# Patient Record
Sex: FEMALE | Race: WHITE | Employment: PART TIME | URBAN - METROPOLITAN AREA
[De-identification: names, ages, dates, MRNs, and addresses within clinical notes are randomized per-mention and may not be internally consistent; named-entity substitution may affect disease eponyms.]

---
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
## 2021-09-13 ENCOUNTER — OFFICE VISIT (OUTPATIENT)
Dept: ORTHOPEDIC SURGERY | Age: 21
End: 2021-09-13
Payer: MEDICAID

## 2021-09-13 VITALS — WEIGHT: 250 LBS | HEIGHT: 63 IN | BODY MASS INDEX: 44.3 KG/M2

## 2021-09-13 DIAGNOSIS — M67.431 GANGLION CYST OF WRIST, RIGHT: Primary | ICD-10-CM

## 2021-09-13 PROCEDURE — 99212 OFFICE O/P EST SF 10 MIN: CPT | Performed by: ORTHOPAEDIC SURGERY

## 2021-09-13 PROCEDURE — G8417 CALC BMI ABV UP PARAM F/U: HCPCS | Performed by: ORTHOPAEDIC SURGERY

## 2021-09-13 PROCEDURE — G8427 DOCREV CUR MEDS BY ELIG CLIN: HCPCS | Performed by: ORTHOPAEDIC SURGERY

## 2021-09-13 PROCEDURE — 1036F TOBACCO NON-USER: CPT | Performed by: ORTHOPAEDIC SURGERY

## 2021-09-13 RX ORDER — MONTELUKAST SODIUM 10 MG/1
TABLET ORAL NIGHTLY
COMMUNITY
Start: 2021-09-09

## 2021-09-13 RX ORDER — CLONIDINE HYDROCHLORIDE 0.3 MG/1
TABLET ORAL
COMMUNITY
Start: 2021-08-10 | End: 2022-04-21

## 2021-09-13 RX ORDER — OMEPRAZOLE 20 MG/1
CAPSULE, DELAYED RELEASE ORAL
COMMUNITY
Start: 2021-08-10 | End: 2022-05-18 | Stop reason: SDUPTHER

## 2021-09-13 NOTE — PROGRESS NOTES
Chief Complaint   Patient presents with    Wrist Pain     Patient here for a ganglion cyst on right wrist. Patient states that she felt it pop about two weeks about and noticed and increase in pain. Geovanna Delvalle is a 22y.o. year old  female who presents for follow up of her right hand ganglion cyst. Her ganglion cyst has returned and is causing pain. Past Medical History:   Diagnosis Date    Asthma      Past Surgical History:   Procedure Laterality Date    APPENDECTOMY  05/17/2017    laparoscopic       Current Outpatient Medications:     cloNIDine (CATAPRES) 0.3 MG tablet, TAKE ONE TABLET BY MOUTH AT BEDTIME, Disp: , Rfl:     montelukast (SINGULAIR) 10 MG tablet, , Disp: , Rfl:     omeprazole (PRILOSEC) 20 MG delayed release capsule, TAKE ONE CAPSULE BY MOUTH DAILY, Disp: , Rfl:     ibuprofen (ADVIL;MOTRIN) 800 MG tablet, Take 1 tablet by mouth every 8 hours as needed for Pain or Fever, Disp: 25 tablet, Rfl: 5    EPINEPHrine (EPIPEN) 0.3 MG/0.3ML SOAJ injection, use as directed, Disp: , Rfl: 1    JUNEL 1.5/30 1.5-30 MG-MCG TABS, TAKE ONE TABLET BY MOUTH DAILY, Disp: , Rfl: 4    buPROPion (WELLBUTRIN XL) 150 MG extended release tablet, TAKE ONE TABLET BY MOUTH EVERY MORNING (Patient not taking: Reported on 9/13/2021), Disp: , Rfl:     naproxen (NAPROSYN) 500 MG tablet, Take 1 tablet by mouth 2 times daily for 7 days, Disp: 14 tablet, Rfl: 0    silver sulfADIAZINE (SILVADENE) 1 % cream, Apply topically daily. (Patient not taking: Reported on 9/13/2021), Disp: 50 g, Rfl: 0    PARoxetine (PAXIL CR) 12.5 MG extended release tablet, TAKE ONE TABLET BY MOUTH EVERY DAY (Patient not taking: Reported on 9/13/2021), Disp: , Rfl: 0    EPINEPHrine (EPIPEN 2-KRYSTA) 0.3 MG/0.3ML SOAJ injection, Inject 0.3 mLs into the skin once as needed for up to 1 dose.  Use as directed for allergic reaction, Disp: 2 each, Rfl: 0    ibuprofen (IBU) 400 MG tablet, Take 1 tablet by mouth every 6 hours as needed for Pain., Disp: 20 tablet, Rfl: 0  Allergies   Allergen Reactions    Bee Venom      Social History     Socioeconomic History    Marital status: Single     Spouse name: Not on file    Number of children: Not on file    Years of education: Not on file    Highest education level: Not on file   Occupational History    Not on file   Tobacco Use    Smoking status: Never Smoker    Smokeless tobacco: Never Used   Substance and Sexual Activity    Alcohol use: No    Drug use: No    Sexual activity: Not on file   Other Topics Concern    Not on file   Social History Narrative    Not on file     Social Determinants of Health     Financial Resource Strain:     Difficulty of Paying Living Expenses:    Food Insecurity:     Worried About Running Out of Food in the Last Year:     [address] in the Last Year:    Transportation Needs:     Lack of Transportation (Medical):  Lack of Transportation (Non-Medical):    Physical Activity:     Days of Exercise per Week:     Minutes of Exercise per Session:    Stress:     Feeling of Stress :    Social Connections:     Frequency of Communication with Friends and Family:     Frequency of Social Gatherings with Friends and Family:     Attends Anabaptism Services:     Active Member of Clubs or Organizations:     Attends Club or Organization Meetings:     Marital Status:    Intimate Partner Violence:     Fear of Current or Ex-Partner:     Emotionally Abused:     Physically Abused:     Sexually Abused:      No family history on file. REVIEW OF SYSTEMS:     General/Constitution:  (-)weight loss, (-)fever, (-)chills, (-)weakness. Skin: (-) rash,(-) psoriasis,(-) eczema, (-)skin cancer. Musculoskeletal: (-) fractures,  (-) dislocations,(-) collagen vascular disease, (-) fibromyalgia, (-) multiple sclerosis, (-) muscular dystrophy, (-) RSD,(-) joint pain (-)swelling, (-) joint pain,swelling.   Neurologic: (-) epilepsy, (-)seizures,(-) brain tumor,(-) TIA, (-)stroke, (-)headaches, (-)Parkinson disease,(-) memory loss, (-) LOC. Cardiovascular: (-) Chest pain, (-) swelling in legs/feet, (-) SOB, (-) cramping in legs/feet with walking. Respiratory: (-) SOB, (-) Coughing, (-) night sweats. GI: (-) nausea, (-) vomiting, (-) diarrhea, (-) blood in stool, (-) gastric ulcer. Psychiatric: (-) Depression, (-) Anxiety, (-) bipolar disease, (-) Alzheimer's Disease  Allergic/Immunologic: (-) allergies latex, (-) allergies metal, (-) skin sensitivity. Hematlogic: (-) anemia, (-) blood transfusion, (-) DVT/PE, (-) Clotting disorders      Subjective:    Constitution:  Ht 5' 3.25\" (1.607 m)   Wt 250 lb (113.4 kg)   BMI 43.94 kg/m²     Psycihatric:  The patient is alert and oriented x 3, appears to be stated age and in no distress. Respiratory:  Respiratory effort is not labored. Patient is not gasping. Palpation of the chest reveals no tactile fremitus. Skin:  Upon inspection: the skin appears warm, dry and intact. There is not a previous scar over the affected area. There is not any cellulitis, lymphedema or cutaneous lesions noted in the lower extremities. Upon palpation there is no induration noted. Neurologic:  Motor exam of the upper extremities show: The reflexes in biceps/triceps/brachioradialis are equal and symmetric. Sensory exam C5-T1 are normal bilaterally. Cardiovascular: The vascular exam is normal and is well perfused to distal extremities. There are 2+ radial pulses bilaterally, and motor and sensation is intact to median, ulnar, and radial, musclocutaneus, and axillary nerve distribution and grossly symmetric bilaterally. There is cap refill noted less than two seconds in all digits. There is not edema of the bilateral upper extremities. There is not varicosities noted in the distal extremities. Lymph:  Upon palpation,  there is no lymphadenopathy noted in bilateral upper extremities.       Musculoskeletal:  Gait: normal; examination of the nails and digits reveal no cyanosis or clubbing. Cervical Exam:  On physical exam, Geovanna Delvalle is well-developed, well-nourished, oriented to person, place and time. her gait is normal.  On evaluation of her cervical spine, she has full range of motion of the cervical spine without pain. There is no cervical tenderness to palpation. Shoulder Exam:  On evaluation of her bilaterally upper extremities, her bilateral shoulder has no deformity. There is not evidence of scapular dyskinesis. There is not muscle atrophy in shoulder girdle. The range of motion for the Right Shoulder is 160/50/t10 and for the Left shoulder is 160/50/t10. Right shoulder Motor strength is 5/5 in the supraspinatus, 5/5 internal rotation and 5/5 in external rotation, and Left shoulder motor strength 5/5 in supraspinatus, 5/5 in internal rotation, 5/5 in external rotation. Elbow exam:  Evaluation of the elbow, reveals no signs of swelling or deformity. ROM is 0-140. There is not instability with varus/valgus stresses. Motor strength is 5/5 with flexion/extension. Wrist exam:  Inspection of the bilateral upper extremities, there is no evidence of deformity of the wrist.  ROM Wrist ROM R wrist DF 90, VF 90, L wrist DF 90, VF 90, R pronation 90/ supination 90, L pronation 90/supination 90. Motor strength is 5/5 with Dorsiflexion/Volarflexion/Supination/Pronation. Motor and sensation is intact and symmetric throughout the bilateral upper extremities in the median, ulnar and radial , musclcutaneous, and axillary nerve distributions. 1x1cm ganglion cyst over radial wrist    Hand exam:  The skin overlying the hand is intact. There is not evidence of scar, lesion, laceration, or abrasion. The motion in the small joints of the hand are intact with no stiffness or deformity. The ROM in the MCP flexion 90/ extension 0 , PIP flexion 90/ extension 0, DIP flexion 70/ extension 0.   There is not rotational deformity. There is no masses or adenopathy in bilateral upper extremities. Radial pulses are 2+ and symmetric bilaterally. Capillary refill is intact and < 2 seconds. Motor strength is 5/5 with flexion and extension of the small finger joints. Right:  Phallens sign(-), Tinnells sign (-), Median nerve compression test (-),  Finklesteins (-), [employer] Grind test (-), [employer](-). Left:    Phallens sign(-), Tinnells sign (-), Median nerve compression test (-),  Finklesteins (-), [employer] Grind test (-), [employer](-). Xrays: n/a    Radiographic findings reviewed with patient    Impression:   Encounter Diagnosis   Name Primary?  Ganglion cyst of wrist, right Yes       Plan:   I discussed the treatment with the patient. I am unable to remove her cyst.  I will refer her to Dr. Zoya Rodriguez.

## 2021-11-17 ENCOUNTER — TELEPHONE (OUTPATIENT)
Dept: ORTHOPEDIC SURGERY | Age: 21
End: 2021-11-17

## 2021-11-17 DIAGNOSIS — M67.40 GANGLION CYST: Primary | ICD-10-CM

## 2021-11-18 ENCOUNTER — OFFICE VISIT (OUTPATIENT)
Dept: ORTHOPEDIC SURGERY | Age: 21
End: 2021-11-18
Payer: MEDICAID

## 2021-11-18 VITALS — BODY MASS INDEX: 43.41 KG/M2 | RESPIRATION RATE: 18 BRPM | WEIGHT: 245 LBS | HEIGHT: 63 IN

## 2021-11-18 DIAGNOSIS — M67.40 GANGLION CYST: Primary | ICD-10-CM

## 2021-11-18 PROCEDURE — G8417 CALC BMI ABV UP PARAM F/U: HCPCS | Performed by: ORTHOPAEDIC SURGERY

## 2021-11-18 PROCEDURE — G8484 FLU IMMUNIZE NO ADMIN: HCPCS | Performed by: ORTHOPAEDIC SURGERY

## 2021-11-18 PROCEDURE — 99214 OFFICE O/P EST MOD 30 MIN: CPT | Performed by: ORTHOPAEDIC SURGERY

## 2021-11-18 PROCEDURE — G8427 DOCREV CUR MEDS BY ELIG CLIN: HCPCS | Performed by: ORTHOPAEDIC SURGERY

## 2021-11-18 PROCEDURE — 1036F TOBACCO NON-USER: CPT | Performed by: ORTHOPAEDIC SURGERY

## 2021-11-18 RX ORDER — BUSPIRONE HYDROCHLORIDE 10 MG/1
10 TABLET ORAL 2 TIMES DAILY
COMMUNITY

## 2021-11-18 NOTE — PROGRESS NOTES
Department of Orthopedic Surgery  History and Physical      CHIEF COMPLAINT:  Right wrist mass    HISTORY OF PRESENT ILLNESS:                The patient is a RHD 22 y.o. female who presents with patient states that she has had a right wrist mass for the last 5 years. She states she has had this drained 2-3 times. Her last drainage was in August of last year. She reports continued recurrence of the cyst.  She states this fluctuates in size. 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 She denies numbness or tingling. She denies any injury. She states this is bothersome to her. Past Medical History:        Diagnosis Date    Anxiety     Asthma     Depression      Past Surgical History:        Procedure Laterality Date    APPENDECTOMY  05/17/2017    laparoscopic    WISDOM TOOTH EXTRACTION       Current Medications:   No current facility-administered medications for this visit. Allergies:  Bee venom    Social History:   TOBACCO:   reports that she has never smoked. She has never used smokeless tobacco.  ETOH:   reports no history of alcohol use. DRUGS:   reports no history of drug use. ACTIVITIES OF DAILY LIVING:    OCCUPATION:    Family History:   No family history on file.     REVIEW OF SYSTEMS:  CONSTITUTIONAL:  negative  EYES:  negative  HEENT:  negative  RESPIRATORY:  asthma  CARDIOVASCULAR:  negative  GASTROINTESTINAL:  negative  GENITOURINARY:  negative  INTEGUMENT/BREAST:  negative  HEMATOLOGIC/LYMPHATIC:  negative  ALLERGIC/IMMUNOLOGIC:  negative  ENDOCRINE:  negative  MUSCULOSKELETAL:  Right wrist pain  NEUROLOGICAL:  negative  BEHAVIOR/PSYCH:  Anxiety, depression    PHYSICAL EXAM:    VITALS:  Resp 18   Ht 5' 3\" (1.6 m)   Wt 245 lb (111.1 kg)   BMI 43.40 kg/m²   CONSTITUTIONAL:  awake, alert, cooperative, no apparent distress, and appears stated age  EYES:  Lids and lashes normal, pupils equal, round and reactive to light, extra ocular muscles intact, sclera clear, conjunctiva normal  ENT:  Normocephalic, without obvious abnormality, atraumatic, sinuses nontender on palpation, external ears without lesions, oral pharynx with moist mucus membranes, tonsils without erythema or exudates, gums normal and good dentition. NECK:  Supple, symmetrical, trachea midline, no adenopathy, thyroid symmetric, not enlarged and no tenderness, skin normal  LUNGS:  CTA  CARDIOVASCULAR:  2+ radial pulses, extremities warm and well perfused  ABDOMEN:  obese, NTTP  CHEST:  Atraumatic   GENITAL/URINARY:  deferred  NEUROLOGIC:  Awake, alert, oriented to name, place and time. Cranial nerves II-XII are grossly intact. Motor is 5 out of 5 bilaterally. Sensory is intact.  gait is normal.  MUSCULOSKELETAL:    Right upper extremity: Non-tender about the shoulder and elbow with good ROM. - tinels of the cubital tunnel, - tinels of the carpal tunnel, - durkans, + finkelsteins, - CMC grind, - tenderness over the A1 pulleys with no active triggering. + palpable mass to the dorsal radial aspect of the wrist with positive transillumination. Full flexion and extension of the fingers. - wartenbergs and cross finger testing, APB strength 5/5 with no atrophy. Median, ulnar, radial n intact to light touch. Brisk capillary refill. Gross motor 5/5. DATA:    CBC:   Lab Results   Component Value Date    WBC 10.2 11/20/2020    RBC 4.81 11/20/2020    HGB 14.2 11/20/2020    HCT 44.5 11/20/2020    MCV 92.5 11/20/2020    MCH 29.5 11/20/2020    MCHC 31.9 11/20/2020    RDW 12.1 11/20/2020     11/20/2020    MPV 10.9 11/20/2020     PT/INR:  No results found for: PROTIME, INR    Radiology Review:  Xray: x-rays of the right wrist were obtained today in the office and reviewed with the patient.  3 views: AP, lateral, oblique: demonstrate no acute fracture dislocations  Impression: No acute fractures or dislocations      IMPRESSION:  · Right dorsal wrist ganglion cyst  · Right DeQuervains release  · Anxiety, depression, Asthma    PLAN:  Discussed findings with patient. Discussed conservative and surgical management with patient. Patient states she would like to have surgical excision of the cyst.  Plan for a right dorsal wrist ganglion cyst excision with DeQuervains release. Postoperative course explained to the patient. Patient like to proceed. All questions answered. I explained the risks, benefits, alternatives and complications of surgery with the patient including but not limited to the risks of infection, possible damage to nerves, vessels, or tendons, stiffness, loss of range of motion, scar sensitivity, wound healing complications, worsening symptoms, possible need for therapy, as well as the possible need further surgery and unanticipated complications. The patient voiced understanding and all questions were answered. The patient elected to proceed with surgical intervention. I have seen and evaluated the patient and agree with the above assessment and plan on today's visit. I have performed the key components of the history and physical examination with significant findings of right wrist de Quervain's tenosynovitis with adjacent right dorsal radial ganglion cyst.  Discussed treatment options in detail. Patient like proceed with cyst excision and first extensor compartment tenolysis. Postoperative course and healing were explained. Risk of recurrence of the cyst was also explained. Poss need for therapy was explained. All questions answered. I concur with the findings and plan as documented.     Dee Voss MD  11/18/2021

## 2021-12-17 ENCOUNTER — PREP FOR PROCEDURE (OUTPATIENT)
Dept: ORTHOPEDIC SURGERY | Age: 21
End: 2021-12-17

## 2021-12-17 RX ORDER — SODIUM CHLORIDE 9 MG/ML
INJECTION, SOLUTION INTRAVENOUS CONTINUOUS
Status: CANCELLED | OUTPATIENT
Start: 2021-12-17

## 2021-12-17 RX ORDER — SODIUM CHLORIDE 0.9 % (FLUSH) 0.9 %
5-40 SYRINGE (ML) INJECTION PRN
Status: CANCELLED | OUTPATIENT
Start: 2021-12-17

## 2021-12-17 RX ORDER — SODIUM CHLORIDE 0.9 % (FLUSH) 0.9 %
5-40 SYRINGE (ML) INJECTION EVERY 12 HOURS SCHEDULED
Status: CANCELLED | OUTPATIENT
Start: 2021-12-17

## 2021-12-17 RX ORDER — SODIUM CHLORIDE 9 MG/ML
25 INJECTION, SOLUTION INTRAVENOUS PRN
Status: CANCELLED | OUTPATIENT
Start: 2021-12-17

## 2021-12-22 RX ORDER — ALBUTEROL SULFATE 90 UG/1
2 AEROSOL, METERED RESPIRATORY (INHALATION) EVERY 6 HOURS PRN
COMMUNITY

## 2021-12-22 NOTE — PROGRESS NOTES
Have you been tested for COVID  Yes           Have you been told you were positive for COVID No  Have you had any known exposure to someone that is positive for COVID No  Do you have a cough                   No              Do you have shortness of breath No                 Do you have a sore throat            No                Are you having chills                    No                Are you having muscle aches. No                    Please come to the hospital wearing a mask and have your significant other wear a mask as well. Both of you should check your temperature before leaving to come here,  if it is 100 or higher please call [phone] for instruction. [facility] PRE-ADMISSION TESTING INSTRUCTIONS    The Preadmission Testing patient is instructed accordingly using the following criteria (check applicable):    ARRIVAL INSTRUCTIONS:  [x] Parking the day of Surgery is located in the Main Entrance lot. Upon entering the door, make an immediate right to the surgery reception desk    [x] Bring photo ID and insurance card    [] Bring in a copy of Living will or Durable Power of  papers.     [x] Please be sure to arrange for responsible adult to provide transportation to and from the hospital    [x] Please arrange for responsible adult to be with you for the 24 hour period post procedure due to having anesthesia      GENERAL INSTRUCTIONS:    [x] Nothing by mouth after midnight, including gum, candy, mints or water    [x] You may brush your teeth, but do not swallow any water    [x] Take medications as instructed with 1-2 oz of water    [] Stop herbal supplements and vitamins 5 days prior to procedure    [] Follow preop dosing of blood thinners per physician instructions    [] Take 1/2 dose of evening insulin, but no insulin after midnight    [] No oral diabetic medications after midnight    [] If diabetic and have low blood sugar or feel symptomatic, take 1-2oz apple juice only    [] Bring inhalers day of surgery    [] Bring C-PAP/ Bi-Pap day of surgery    [x] Bring urine specimen day of surgery    [x] Shower or bath with soap, lather and rinse well, AM of Surgery, no lotion, powders or creams to surgical site    [] Follow bowel prep as instructed per surgeon    [x] No tobacco products within 24 hours of surgery     [x] No alcohol or illegal drug use within 24 hours of surgery.     [x] Jewelry, body piercing's, eyeglasses, contact lenses and dentures are not permitted into surgery (bring cases)      [x] Please do not wear any nail polish, make up or hair products on the day of surgery    [x] You can expect a call the business day prior to procedure to notify you if your arrival time changes    [x] If you receive a survey after surgery we would greatly appreciate your comments    [] Parent/guardian of a minor must accompany their child and remain on the premises  the entire time they are under our care     [] Pediatric patients may bring favorite toy, blanket or comfort item with them    [] A caregiver or family member must remain with the patient during their stay if they are mentally handicapped, have dementia, disoriented or unable to use a call light or would be a safety concern if left unattended    [x] Please notify surgeon if you develop any illness between now and time of surgery (cold, cough, sore throat, fever, nausea, vomiting) or any signs of infections  including skin, wounds, and dental.    [x]  The Outpatient Pharmacy is available to fill your prescription here on your day of surgery, ask your preop nurse for details    [] Other instructions    EDUCATIONAL MATERIALS PROVIDED:    [] PAT Preoperative Education Packet/Booklet     [] Medication List    [] Transfusion bracelet applied with instructions    [] Shower with soap, lather and rinse well, and use CHG wipes provided the evening before surgery as instructed    [] Incentive spirometer with instructions

## 2021-12-28 ENCOUNTER — ANESTHESIA (OUTPATIENT)
Dept: OPERATING ROOM | Age: 21
End: 2021-12-28
Payer: MEDICAID

## 2021-12-28 ENCOUNTER — HOSPITAL ENCOUNTER (OUTPATIENT)
Age: 21
Setting detail: OUTPATIENT SURGERY
Discharge: HOME | End: 2021-12-28
Attending: ORTHOPAEDIC SURGERY | Admitting: ORTHOPAEDIC SURGERY
Payer: MEDICAID

## 2021-12-28 ENCOUNTER — ANESTHESIA EVENT (OUTPATIENT)
Dept: OPERATING ROOM | Age: 21
End: 2021-12-28
Payer: MEDICAID

## 2021-12-28 VITALS
WEIGHT: 245 LBS | SYSTOLIC BLOOD PRESSURE: 118 MMHG | HEART RATE: 69 BPM | BODY MASS INDEX: 43.41 KG/M2 | HEIGHT: 63 IN | OXYGEN SATURATION: 100 % | RESPIRATION RATE: 16 BRPM | DIASTOLIC BLOOD PRESSURE: 72 MMHG | TEMPERATURE: 97 F

## 2021-12-28 VITALS — OXYGEN SATURATION: 100 % | DIASTOLIC BLOOD PRESSURE: 58 MMHG | SYSTOLIC BLOOD PRESSURE: 108 MMHG

## 2021-12-28 DIAGNOSIS — G89.18 POST-OPERATIVE PAIN: Primary | ICD-10-CM

## 2021-12-28 LAB
HCG, URINE, POC: NEGATIVE
Lab: NORMAL
NEGATIVE QC PASS/FAIL: NORMAL
POSITIVE QC PASS/FAIL: NORMAL

## 2021-12-28 PROCEDURE — 2500000003 HC RX 250 WO HCPCS: Performed by: ORTHOPAEDIC SURGERY

## 2021-12-28 PROCEDURE — 2580000003 HC RX 258: Performed by: PHYSICIAN ASSISTANT

## 2021-12-28 PROCEDURE — 25111 REMOVE WRIST TENDON LESION: CPT | Performed by: ORTHOPAEDIC SURGERY

## 2021-12-28 PROCEDURE — 2709999900 HC NON-CHARGEABLE SUPPLY: Performed by: ORTHOPAEDIC SURGERY

## 2021-12-28 PROCEDURE — 3600000002 HC SURGERY LEVEL 2 BASE: Performed by: ORTHOPAEDIC SURGERY

## 2021-12-28 PROCEDURE — 3700000000 HC ANESTHESIA ATTENDED CARE: Performed by: ORTHOPAEDIC SURGERY

## 2021-12-28 PROCEDURE — 7100000011 HC PHASE II RECOVERY - ADDTL 15 MIN: Performed by: ORTHOPAEDIC SURGERY

## 2021-12-28 PROCEDURE — 25000 INCISION OF TENDON SHEATH: CPT | Performed by: ORTHOPAEDIC SURGERY

## 2021-12-28 PROCEDURE — 88304 TISSUE EXAM BY PATHOLOGIST: CPT

## 2021-12-28 PROCEDURE — 3700000001 HC ADD 15 MINUTES (ANESTHESIA): Performed by: ORTHOPAEDIC SURGERY

## 2021-12-28 PROCEDURE — 6360000002 HC RX W HCPCS: Performed by: PHYSICIAN ASSISTANT

## 2021-12-28 PROCEDURE — 7100000010 HC PHASE II RECOVERY - FIRST 15 MIN: Performed by: ORTHOPAEDIC SURGERY

## 2021-12-28 PROCEDURE — 6360000002 HC RX W HCPCS: Performed by: NURSE ANESTHETIST, CERTIFIED REGISTERED

## 2021-12-28 PROCEDURE — 3600000012 HC SURGERY LEVEL 2 ADDTL 15MIN: Performed by: ORTHOPAEDIC SURGERY

## 2021-12-28 PROCEDURE — 2500000003 HC RX 250 WO HCPCS: Performed by: NURSE ANESTHETIST, CERTIFIED REGISTERED

## 2021-12-28 RX ORDER — SODIUM CHLORIDE 0.9 % (FLUSH) 0.9 %
5-40 SYRINGE (ML) INJECTION EVERY 12 HOURS SCHEDULED
Status: DISCONTINUED | OUTPATIENT
Start: 2021-12-28 | End: 2021-12-28 | Stop reason: HOSPADM

## 2021-12-28 RX ORDER — FENTANYL CITRATE 50 UG/ML
INJECTION, SOLUTION INTRAMUSCULAR; INTRAVENOUS PRN
Status: DISCONTINUED | OUTPATIENT
Start: 2021-12-28 | End: 2021-12-28 | Stop reason: SDUPTHER

## 2021-12-28 RX ORDER — SODIUM CHLORIDE 9 MG/ML
INJECTION, SOLUTION INTRAVENOUS CONTINUOUS
Status: DISCONTINUED | OUTPATIENT
Start: 2021-12-28 | End: 2021-12-28 | Stop reason: HOSPADM

## 2021-12-28 RX ORDER — SODIUM CHLORIDE 0.9 % (FLUSH) 0.9 %
5-40 SYRINGE (ML) INJECTION PRN
Status: DISCONTINUED | OUTPATIENT
Start: 2021-12-28 | End: 2021-12-28 | Stop reason: HOSPADM

## 2021-12-28 RX ORDER — SODIUM CHLORIDE 9 MG/ML
25 INJECTION, SOLUTION INTRAVENOUS PRN
Status: DISCONTINUED | OUTPATIENT
Start: 2021-12-28 | End: 2021-12-28 | Stop reason: HOSPADM

## 2021-12-28 RX ORDER — LIDOCAINE HYDROCHLORIDE AND EPINEPHRINE 10; 10 MG/ML; UG/ML
INJECTION, SOLUTION INFILTRATION; PERINEURAL PRN
Status: DISCONTINUED | OUTPATIENT
Start: 2021-12-28 | End: 2021-12-28 | Stop reason: ALTCHOICE

## 2021-12-28 RX ORDER — GLYCOPYRROLATE 1 MG/5 ML
SYRINGE (ML) INTRAVENOUS PRN
Status: DISCONTINUED | OUTPATIENT
Start: 2021-12-28 | End: 2021-12-28 | Stop reason: SDUPTHER

## 2021-12-28 RX ORDER — PROPOFOL 10 MG/ML
INJECTION, EMULSION INTRAVENOUS PRN
Status: DISCONTINUED | OUTPATIENT
Start: 2021-12-28 | End: 2021-12-28 | Stop reason: SDUPTHER

## 2021-12-28 RX ORDER — ONDANSETRON 2 MG/ML
INJECTION INTRAMUSCULAR; INTRAVENOUS PRN
Status: DISCONTINUED | OUTPATIENT
Start: 2021-12-28 | End: 2021-12-28 | Stop reason: SDUPTHER

## 2021-12-28 RX ORDER — MIDAZOLAM HYDROCHLORIDE 1 MG/ML
INJECTION INTRAMUSCULAR; INTRAVENOUS PRN
Status: DISCONTINUED | OUTPATIENT
Start: 2021-12-28 | End: 2021-12-28 | Stop reason: SDUPTHER

## 2021-12-28 RX ORDER — HYDROCODONE BITARTRATE AND ACETAMINOPHEN 5; 325 MG/1; MG/1
1 TABLET ORAL EVERY 6 HOURS PRN
Qty: 15 TABLET | Refills: 0 | Status: SHIPPED | OUTPATIENT
Start: 2021-12-28 | End: 2022-01-04

## 2021-12-28 RX ORDER — PROPOFOL 10 MG/ML
INJECTION, EMULSION INTRAVENOUS PRN
Status: DISCONTINUED | OUTPATIENT
Start: 2021-12-28 | End: 2021-12-28

## 2021-12-28 RX ADMIN — ONDANSETRON 4 MG: 2 INJECTION INTRAMUSCULAR; INTRAVENOUS at 09:13

## 2021-12-28 RX ADMIN — MIDAZOLAM 2 MG: 1 INJECTION INTRAMUSCULAR; INTRAVENOUS at 09:09

## 2021-12-28 RX ADMIN — FENTANYL CITRATE 50 MCG: 50 INJECTION, SOLUTION INTRAMUSCULAR; INTRAVENOUS at 09:14

## 2021-12-28 RX ADMIN — SODIUM CHLORIDE: 9 INJECTION, SOLUTION INTRAVENOUS at 09:09

## 2021-12-28 RX ADMIN — Medication 2000 MG: at 09:15

## 2021-12-28 RX ADMIN — Medication 0.2 MG: at 09:09

## 2021-12-28 RX ADMIN — PROPOFOL 700 MG: 10 INJECTION, EMULSION INTRAVENOUS at 09:14

## 2021-12-28 RX ADMIN — FENTANYL CITRATE 50 MCG: 50 INJECTION, SOLUTION INTRAMUSCULAR; INTRAVENOUS at 09:18

## 2021-12-28 ASSESSMENT — PULMONARY FUNCTION TESTS
PIF_VALUE: 0
PIF_VALUE: 1
PIF_VALUE: 0
PIF_VALUE: 1
PIF_VALUE: 0

## 2021-12-28 ASSESSMENT — PAIN - FUNCTIONAL ASSESSMENT: PAIN_FUNCTIONAL_ASSESSMENT: 0-10

## 2021-12-28 NOTE — PROGRESS NOTES
Patient provided discharge instructions and verbalizes understanding  Patient discharged home safely with brother  Sling in place at time of discharge

## 2021-12-28 NOTE — BRIEF OP NOTE
Brief Postoperative Note      Patient: Brina Donis  YOB: 2000  MRN: [id number]    Date of Procedure: 12/28/2021    Pre-Op Diagnosis: GANGLION CYST RIGHT  DEQUERVAINS    Post-Op Diagnosis: Same       Procedure(s):  RIGHT DEQUERVAINS RELEASE  RIGHT WRIST GANGLION CYST EXCISION    Surgeon(s):  Venancio Bailey MD    Assistant:   Will Beucler    Anesthesia: Monitor Anesthesia Care    Estimated Blood Loss (mL): Minimal    Complications: None    Specimens:   * No specimens in log *    Implants:  * No implants in log *      Drains: * No LDAs found *    Findings: see op note    Electronically signed by OBED Islas on 12/28/2021 at 9:40 AM

## 2021-12-28 NOTE — PROGRESS NOTES
CLINICAL PHARMACY NOTE: MEDS TO BEDS    Total # of Prescriptions Filled: 1   The following medications were delivered to the patient:  · norco 5/325    Additional Documentation:

## 2021-12-28 NOTE — ANESTHESIA PRE PROCEDURE
43.4 kg/m². CBC:   Lab Results   Component Value Date    WBC 10.2 11/20/2020    RBC 4.81 11/20/2020    HGB 14.2 11/20/2020    HCT 44.5 11/20/2020    MCV 92.5 11/20/2020    RDW 12.1 11/20/2020     11/20/2020       CMP:   Lab Results   Component Value Date     11/20/2020    K 4.2 11/20/2020     11/20/2020    CO2 23 11/20/2020    BUN 13 11/20/2020    CREATININE 0.9 11/20/2020    GFRAA >60 11/20/2020    LABGLOM >60 11/20/2020    GLUCOSE 101 05/17/2017    PROT 7.6 11/20/2020    CALCIUM 9.3 11/20/2020    BILITOT 0.2 11/20/2020    ALKPHOS 117 11/20/2020    AST 22 11/20/2020    ALT 27 11/20/2020       POC Tests: No results for input(s): POCGLU, POCNA, POCK, POCCL, POCBUN, POCHEMO, POCHCT in the last 72 hours. Coags: No results found for: PROTIME, INR, APTT    HCG (If Applicable):   Lab Results   Component Value Date    PREGTESTUR neg 05/17/2017        ABGs: No results found for: PHART, PO2ART, IWM7IMT, KIY7UBZ, BEART, B3RCRLVM     Type & Screen (If Applicable):  No results found for: LABABO, LABRH    Drug/Infectious Status (If Applicable):  No results found for: HIV, HEPCAB    COVID-19 Screening (If Applicable): No results found for: COVID19        Anesthesia Evaluation  Patient summary reviewed and Nursing notes reviewed no history of anesthetic complications:   Airway: Mallampati: II  TM distance: >3 FB   Neck ROM: full  Mouth opening: > = 3 FB Dental: normal exam         Pulmonary:normal exam    (+) asthma: seasonal asthma,                            Cardiovascular:Negative CV ROS  Exercise tolerance: good (>4 METS),                     Neuro/Psych:   (+) headaches:, psychiatric history:            GI/Hepatic/Renal: Neg GI/Hepatic/Renal ROS            Endo/Other: Negative Endo/Other ROS                    Abdominal:             Vascular: negative vascular ROS. Other Findings:             Anesthesia Plan      MAC     ASA 2       Induction: intravenous.       Anesthetic plan and risks discussed with patient and sibling. Plan discussed with CRNA and surgical team.    Attending anesthesiologist reviewed and agrees with Karen Parra MD   12/28/2021        Patient seen; chart reviewed and agree with above.     Ashley Russ MD

## 2021-12-28 NOTE — H&P
Department of Orthopedic Surgery  History and Physical        CHIEF COMPLAINT:  Right wrist mass     HISTORY OF PRESENT ILLNESS:                 The patient is a RHD 22 y.o. female who presents with patient states that she has had a right wrist mass for the last 5 years. She states she has had this drained 2-3 times. Her last drainage was in August of last year. She reports continued recurrence of the cyst.  She states this fluctuates in size. 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 She denies numbness or tingling. She denies any injury. She states this is bothersome to her.      Past Medical History:    Past Medical History             Diagnosis Date    Anxiety      Asthma      Depression           Past Surgical History:    Past Surgical History             Procedure Laterality Date    APPENDECTOMY   05/17/2017     laparoscopic    WISDOM TOOTH EXTRACTION             Current Medications:   Current Hospital Medications   No current facility-administered medications for this visit. Allergies:  Bee venom     Social History:   TOBACCO:   reports that she has never smoked. She has never used smokeless tobacco.  ETOH:   reports no history of alcohol use. DRUGS:   reports no history of drug use.   ACTIVITIES OF DAILY LIVING:    OCCUPATION:    Family History:   Family History   No family history on file.        REVIEW OF SYSTEMS:  CONSTITUTIONAL:  negative  EYES:  negative  HEENT:  negative  RESPIRATORY:  asthma  CARDIOVASCULAR:  negative  GASTROINTESTINAL:  negative  GENITOURINARY:  negative  INTEGUMENT/BREAST:  negative  HEMATOLOGIC/LYMPHATIC:  negative  ALLERGIC/IMMUNOLOGIC:  negative  ENDOCRINE:  negative  MUSCULOSKELETAL:  Right wrist pain  NEUROLOGICAL:  negative  BEHAVIOR/PSYCH:  Anxiety, depression     PHYSICAL EXAM:    VITALS:  Resp 18   Ht 5' 3\" (1.6 m)   Wt 245 lb (111.1 kg)   BMI 43.40 kg/m²   CONSTITUTIONAL:  awake, alert, cooperative, no apparent distress, and appears stated age  EYES:  Lids and lashes normal, pupils equal, round and reactive to light, extra ocular muscles intact, sclera clear, conjunctiva normal  ENT:  Normocephalic, without obvious abnormality, atraumatic, sinuses nontender on palpation, external ears without lesions, oral pharynx with moist mucus membranes, tonsils without erythema or exudates, gums normal and good dentition. NECK:  Supple, symmetrical, trachea midline, no adenopathy, thyroid symmetric, not enlarged and no tenderness, skin normal  LUNGS:  CTA  CARDIOVASCULAR:  2+ radial pulses, extremities warm and well perfused  ABDOMEN:  obese, NTTP  CHEST:  Atraumatic   GENITAL/URINARY:  deferred  NEUROLOGIC:  Awake, alert, oriented to name, place and time. Cranial nerves II-XII are grossly intact. Motor is 5 out of 5 bilaterally. Sensory is intact.  gait is normal.  MUSCULOSKELETAL:     Right upper extremity: Non-tender about the shoulder and elbow with good ROM. - tinels of the cubital tunnel, - tinels of the carpal tunnel, - durkans, + finkelsteins, - CMC grind, - tenderness over the A1 pulleys with no active triggering. + palpable mass to the dorsal radial aspect of the wrist with positive transillumination. Full flexion and extension of the fingers. - wartenbergs and cross finger testing, APB strength 5/5 with no atrophy. Median, ulnar, radial n intact to light touch. Brisk capillary refill. Gross motor 5/5.         DATA:    CBC:         Lab Results   Component Value Date     WBC 10.2 11/20/2020     RBC 4.81 11/20/2020     HGB 14.2 11/20/2020     HCT 44.5 11/20/2020     MCV 92.5 11/20/2020     MCH 29.5 11/20/2020     MCHC 31.9 11/20/2020     RDW 12.1 11/20/2020      11/20/2020     MPV 10.9 11/20/2020      PT/INR:  No results found for: PROTIME, INR     Radiology Review:  Xray: x-rays of the right wrist were obtained today in the office and reviewed with the patient.  3 views: AP, lateral, oblique: demonstrate no acute fracture dislocations  Impression: No acute fractures or dislocations        IMPRESSION:  · Right dorsal wrist ganglion cyst  · Right DeQuervains release  · Anxiety, depression, Asthma     PLAN:  Discussed findings with patient. Discussed conservative and surgical management with patient. Patient states she would like to have surgical excision of the cyst.  Plan for a right dorsal wrist ganglion cyst excision with DeQuervains release. Postoperative course explained to the patient. Patient like to proceed. All questions answered.     I explained the risks, benefits, alternatives and complications of surgery with the patient including but not limited to the risks of infection, possible damage to nerves, vessels, or tendons, stiffness, loss of range of motion, scar sensitivity, wound healing complications, worsening symptoms, possible need for therapy, as well as the possible need further surgery and unanticipated complications. The patient voiced understanding and all questions were answered. The patient elected to proceed with surgical intervention.         I have seen and evaluated the patient and agree with the above assessment and plan on today's visit. I have performed the key components of the history and physical examination with significant findings of right wrist de Quervain's tenosynovitis with adjacent right dorsal radial ganglion cyst.  Discussed treatment options in detail. Patient like proceed with cyst excision and first extensor compartment tenolysis. Postoperative course and healing were explained. Risk of recurrence of the cyst was also explained. Poss need for therapy was explained. All questions answered. I concur with the findings and plan as documented.     History and Physical Update     Patient was seen and examined. Patient's history and physical was reviewed with the patient. There has been no significant interval changes.       Electronically signed by Rach Bauer MD on 12/28/2021 at 7:22 AM

## 2021-12-28 NOTE — OP NOTE
Operative Note      Patient: Rylan Busch  YOB: 2000  MRN: [id number]    Date of Procedure: 12/28/2021    Pre-Op Diagnosis: GANGLION CYST RIGHT  DEQUERVAINS    Post-Op Diagnosis: Same       Procedure(s):  RIGHT DEQUERVAINS RELEASE  RIGHT WRIST GANGLION CYST EXCISION    Surgeon(s):  Ced Becker MD    Assistant:   Physician Assistant: OBED Julien  Resident: Clifton REYES BeuclerDO    Anesthesia: Monitor Anesthesia Care    Estimated Blood Loss (mL): Minimal    Complications: Bleeding    Specimens:   ID Type Source Tests Collected by Time Destination   A : right ganglion cyst Specimen Cyst SURGICAL PATHOLOGY Ced Becker MD 12/28/2021 [id number]        Implants:  * No implants in log *      Drains: * No LDAs found *    Findings: see details    Detailed Description of Procedure:                                     OPERATIVE REPORT          DATE OF PROCEDURE:   12/28/2021     PREOPERATIVE DIAGNOSES:    1. Right De Quervain's tenosynovitis. 2. Right wrist ganglion cyste     POSTOPERATIVE DIAGNOSES: same       SURGERY PERFORMED:  1. Right De Quervain's release. 2. Right wrist ganglion cyst excision     ANESTHESIA:  1. Monitored anesthesia care. 2.  Local anesthetic by surgeon consisting of approximately 10 mL of 0.25%  Marcaine with epinephrine.     ASSISTANT: Dr Brayden Presley orthopedic surgery resident       FINDINGS:  1.  Evidence of De Quervain's tenosynovitis involving the extensor  compartment that was adequately decompressed with release of the  compartment. 2. No significant tendon instability was appreciated status post  Decompression. 3.  Large ganglion cyst arising off the dorsal radial wrist capsule between the first and second wrist compartments. Cyst was excised and sent for pathology.     SPECIMENS:  None.     COMPLICATIONS:  None.     DISPOSITION:  The patient remained stable throughout the procedure.     OPERATIVE INDICATIONS:  The patient presents with persistent and recalcitrant left radial-sided wrist pain. After failing conservative management, they wished to proceed with surgical  intervention. The risks included, but were not limited to the risk of  infection; damage to nerves, vessels, or tendons; tendon instability;  failure to improve her symptoms; worsening symptoms; recurrence of  symptoms; scar adhesions; as well as recurrence of the cyst.  The patient  understood and wished to proceed.     SURGERY IN DETAIL:  The patient was identified in the holding area. The  left wrist was identified as the surgical site. The patient was then seen by  Anesthesia, taken to the operating room, placed supine on the table, and  underwent monitored anesthesia care per the Anesthesia Department. Under  sterile conditions, 10 mL of 0.25% Marcaine with epinephrine was  infiltrated over the surgical site. A well-padded arm tourniquet was  placed. The left upper extremity was prepped and draped in the standard  sterile fashion. The arm was exsanguinated. Tourniquet was inflated to  250 mmHg. Total tourniquet time was approximately 17 minutes.     An incision was made over the radial styloid of the wrist longitudinally  followed by blunt dissection, identification, and preservation of the  radial sensory nerve branches. These were retracted. The first extensor  compartment was then clearly identified. An incision over the dorsal ulnar aspect of the  extensor reticulum to the first compartment was then created. There was  multiple slips of the extensors present. Decompression was taken from the  myotendinous origin to the distal insertion distally. There was evidence  of stenosis that was adequately decompressed with release of the  compartment. There was a separate compartment to the APL and EPB  Tendons. The separate compartment was released fully decompressing both the EPB and APL tendons. The intracompartmental septum was debrided to stable margins.     Attention was then directed distally and ulnar to the first compartment. Blunt dissection was performed identify the cyst arising over the dorsal radial wrist capsule. The cyst was traced down to the dorsal radial capsule between the first and second compartments. The cyst also traced proximally and radially beneath the first compartment. The radial artery was identified and preserved distal to the cyst.  Meticulous dissection under loupe magnification was used for hemostasis cauterizing with bipolar cautery the small perforating branches off the radial artery. The cyst was then mobilized off the radial styloid and resected to the dorsal capsule and removed with a stalk extending down to the dorsal capsule. Cautery was used to cauterize the dorsal capsule origin of the cyst.  The cyst was filled with a clear gelatinous material consistent with a ganglion. The cyst was sent for pathology.     At this point, the tourniquet was deflated. Hemostasis was achieved with  bipolar cautery. The radial artery was inspected. The artery was patent with good hemostasis achieved. With pronation and supination of the forearm, there was  no significant tendon instability. The skin was closed in layers with 3-0  Vicryl and a 4-0 Monocryl.   Mastisol and Steri-Strips were applied followed  by a soft bulky sterile dressing and thumb spica splint    The patient tolerated the procedure well and was taken to the recovery room in stable condition    Electronically signed by Sammy Diez MD on 12/28/2021 at 9:53 AM           Electronically signed by Sammy Diez MD on 12/28/2021 at 9:52 AM

## 2021-12-28 NOTE — ANESTHESIA POSTPROCEDURE EVALUATION
Department of Anesthesiology  Postprocedure Note    Patient: Iwona Arenas  MRN: [id number]  [place]: 2000  Date of evaluation: 12/28/2021  Time:  10:15 AM     Procedure Summary     Date: 12/28/21 Room / Location: [facility] OR 04 / [facility]    Anesthesia Start: 8073 Anesthesia Stop: 1015    Procedures:       RIGHT DEQUERVAINS RELEASE (Right )      RIGHT WRIST GANGLION CYST EXCISION (Right ) Diagnosis: (GANGLION CYST RIGHT  [place])    Surgeons: Alma Geiger MD Responsible Provider: Latonya James MD    Anesthesia Type: MAC ASA Status: 2          Anesthesia Type: MAC    Isabel Phase I: Isabel Score: 10    Isable Phase II:      Last vitals: Reviewed and per EMR flowsheets.        Anesthesia Post Evaluation    Patient location during evaluation: bedside  Patient participation: complete - patient participated  Level of consciousness: awake and alert  Airway patency: patent  Nausea & Vomiting: no nausea and no vomiting  Complications: no  Cardiovascular status: blood pressure returned to baseline  Respiratory status: acceptable  Hydration status: euvolemic

## 2022-01-03 ENCOUNTER — OFFICE VISIT (OUTPATIENT)
Dept: ORTHOPEDIC SURGERY | Age: 22
End: 2022-01-03

## 2022-01-03 VITALS — RESPIRATION RATE: 18 BRPM | WEIGHT: 245 LBS | BODY MASS INDEX: 43.41 KG/M2 | HEIGHT: 63 IN

## 2022-01-03 DIAGNOSIS — R21 RASH: Primary | ICD-10-CM

## 2022-01-03 PROCEDURE — 99024 POSTOP FOLLOW-UP VISIT: CPT | Performed by: ORTHOPAEDIC SURGERY

## 2022-01-03 RX ORDER — BETAMETHASONE DIPROPIONATE 0.5 MG/G
CREAM TOPICAL
Qty: 50 G | Refills: 0 | Status: SHIPPED
Start: 2022-01-03 | End: 2022-02-07

## 2022-01-03 NOTE — PROGRESS NOTES
HPI: Patient presents today 5 days s/p right de Quervain's release with cyst excision. Overall she is doing okay. She does report a lot of itching to her right upper extremity especially under her splint. She has been taking Benadryl with no relief of her symptoms. Physical Exam: Incisions clean dry intact with Steri-Strips in place. She does have a contact dermatitis rash from the prep used at the time of surgery throughout her right upper extremity. Stiffness and pain with flexion extension of the thumb. Brisk capillary refill. Otherwise neurovascular intact. Pathology pending    Assessment:  5 days s/p right de Quervain's release with cyst excision    Plan:   Patient provided cortisone cream to use over her rash. She was advised not to use this over her incision. Patient provided a cock-up wrist brace for comfort. Offered the patient referral to therapy, patient will work on range of motion on her own. She will contact the office if she would like a referral to therapy. Activity restrictions reviewed with patient. Follow up in 1 month if needed. All questions and concerns answered. I have seen and evaluated the patient and agree with the above assessment and plan on today's visit. I have performed the key components of the history and physical examination with significant findings of postop de Quervain's release and ganglion cyst excision. Postoperative course uncomplicated with allergic reaction to her surgical prep. No active signs of infection. Steroid cream as prescribed. Patient was offered but declined formal therapy. She is fitted with a removable cock-up wrist brace. Home exercises were explained and demonstrated. . I concur with the findings and plan as documented.     Rach Bauer MD  1/3/2022

## 2022-02-07 ENCOUNTER — OFFICE VISIT (OUTPATIENT)
Dept: ORTHOPEDIC SURGERY | Age: 22
End: 2022-02-07

## 2022-02-07 VITALS — RESPIRATION RATE: 20 BRPM | HEIGHT: 63 IN | BODY MASS INDEX: 43.41 KG/M2 | WEIGHT: 245 LBS

## 2022-02-07 DIAGNOSIS — M67.431 GANGLION CYST OF WRIST, RIGHT: Primary | ICD-10-CM

## 2022-02-07 PROCEDURE — 99024 POSTOP FOLLOW-UP VISIT: CPT | Performed by: ORTHOPAEDIC SURGERY

## 2022-02-07 RX ORDER — LEVONORGESTREL / ETHINYL ESTRADIOL AND ETHINYL ESTRADIOL 150-30(84)
1 KIT ORAL DAILY
COMMUNITY
Start: 2022-01-26 | End: 2022-04-21

## 2022-02-07 NOTE — PROGRESS NOTES
HPI: Patient presents today 6 weeks s/p right de Quervain's release with cyst excision. Overall she is doing well. She does report sensitivity over her scar and pins-and-needles up to the dorsal aspect of the right thumb. Physical Exam: Incision healing well. No erythema or signs of infection. Full flexion and extension of the fingers. + scar sensitivity and sensitivity of the radial sensory nerve. Brisk capillary refill. Otherwise neurovascular intact. Assessment:  6 weeks s/p right de Quervain's release with cyst excision    Plan:   Discussed findings with patient. Discussed scar sensitivity and sensitivity of the radial sensory nerve. Recommended referral to therapy. Patient to follow up after if needed. I have seen and evaluated the patient and agree with the above assessment and plan on today's visit. I have performed the key components of the history and physical examination with significant findings of 6 weeks postop with continued scar sensitivity. Recommended a course of therapy. May follow-up after therapy is completed if symptoms persist. I concur with the findings and plan as documented.     Sammy Diez MD  2/7/2022

## 2022-02-21 ENCOUNTER — EVALUATION (OUTPATIENT)
Dept: OCCUPATIONAL THERAPY | Age: 22
End: 2022-02-21
Payer: MEDICAID

## 2022-02-21 DIAGNOSIS — M67.431 GANGLION OF RIGHT WRIST: Primary | ICD-10-CM

## 2022-02-21 PROCEDURE — 97530 THERAPEUTIC ACTIVITIES: CPT

## 2022-02-21 PROCEDURE — 97035 APP MDLTY 1+ULTRASOUND EA 15: CPT

## 2022-02-21 PROCEDURE — 97165 OT EVAL LOW COMPLEX 30 MIN: CPT

## 2022-02-21 PROCEDURE — 97022 WHIRLPOOL THERAPY: CPT

## 2022-02-21 NOTE — PROGRESS NOTES
OCCUPATIONAL THERAPY INITIAL EVALUATION    [address]  [facility] OCCUPATIONAL THERAPY  [address] [facility] [address] [address]  Dept: [phone]  Loc: [address] [facility] Fax: [phone]    Date:  2022  Initial Evaluation Date: 2022   Evaluating Therapist: Sandy Benitez OT    Patient Name:  Cadence Miranda    :  2000    Restrictions/Precautions:  low fall risk  Diagnosis:  M67.431 (ICD-10-CM) - Ganglion cyst of wrist, right       Date of Surgery/Injury: 2021 (7 weeks post-op)    Insurance/Certification information:  [employer] of care signed (Y/N): N  Visit# / total visits:     Referring Practitioner:  Rubens Bahena MD  Specific Practitioner Orders: Scar desensitization and management, modalities PRN    Assessment of current deficits   [] Functional mobility  [x] ADLs  [x] Strength   [] Cognition   [] Functional transfers   [x] IADLs  [] Safety Awareness  [] Endurance   [] Fine Motor Coordination  [] Balance  [] Vision/perception  [x] Sensation    [] Gross Motor Coordination [] ROM  [x] Pain   [] Edema    [x] Scar Adhesion/Skin Integrity     OT PLAN OF CARE   OT POC based on physician orders, patient diagnosis and results of clinical assessment    Frequency/Duration 1-2x per week for 12 visits  Certification Period 2022-2022  Specific OT Treatment to include:     [x] Instruction in HEP                   Modalities:  [x] Therapeutic Exercise        [x] Ultrasound               [] Electrical Stimulation/Attended  [x] PROM/Stretching                    [x] Fluidotherapy          [x]  Paraffin                   [x] AAROM  [x] AROM                 [] Iontophoresis:   [x] Tendon Glides                                               [] Neuromuscular Re-Ed            [] ADL/IADL re-training    [x] Therapeutic Activity       [x] Pain Management with/without modalities PRN [x] Manual Therapy                      [x] Splinting                      [x] Scar Management                   []Joint Protection/Training  []Ergonomics                             [x] Joint Mobilization        [] Adaptive Equipment Assessment/Training                             [] Manual Edema Mobilization   [x] Myofascial Release                 [] Energy Conservation/Work Simplification  [] GM/FM Coordination       [] Safety retraining/education per  individual diagnosis/goals  [x] Desensitization       Patient Specific Goal: for the pain to go away and be able to push herself off the floor                             GOALS (Long term same as Short term):  1.) Patient will demonstrate good understanding of home program (exercises/activities/diagnosis/prognosis/goals) with good accuracy. 2.) Patient to report decreased pain in their affected right upper extremity from 3/10 to 1/10 or less with resistive functional use.   3.) Patient will demonstrate increased /pinch strength of at least 2-5 pinch pounds (14# lateral pinch, 13# tripod pinch) of their right hand. 4.) Patient to report 100% compliance with their splint wear, care, and precautions if needed. 5.) Patient to report a decrease in hypersensitivity from moderate to mild/none in their R scar.   6.) Patient will demonstrate a non-tender/non-adherent scar.   7.) Patient will demonstrate improved functional activity tolerance from fair- to fair+ for ADL/IADL completion. 8.) Patient will decrease QuickDASH score to 10% or less for increased participation in daily functional activities.      Past Medical History:   Past Medical History:   Diagnosis Date    Anxiety     Asthma     Bipolar 1 disorder ([facility] [address]     Depression     Ganglion cyst     right    Migraine     Ovarian cyst 2013     Past Surgical History:   Past Surgical History:   Procedure Laterality Date    APPENDECTOMY  05/17/2017    laparoscopic    HAND SURGERY Right 12/28/2021 RIGHT WRIST GANGLION CYST EXCISION performed by Richard Teixeira MD at [facility] WRIST SURGERY Right 12/28/2021    RIGHT DEQUERVAINS RELEASE performed by Richard Teixeira MD at [address] [facility]       Reason for Referral: Pt is a pleasant 21 y/o who underwent a DeQuervain's release and wrist ganglion cyst excision in her R UE on 12/28/2021. She has since been experience pain with resistive activities and sensitivity around the scar. She now presents to therapy to address these symptoms. Home Living: lives at home with mom and brothers  Prior Level of Function: independent    Cognition: Alert/Oriented x3     IADL STATUS: Pt is independent with all IADLs though reports difficulty with work-related activities. 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 Also experiences discomfort when completing cleaning activities. She is driving, though experiences pain when turning the wheel with her R hand. ADL STATUS: Pt is independent with all ADLs. Pain Level: 3 on scale of 1-10, tight (pulling)    UE Assessment: Pt B UE ROM WFL. Pinch strength mildly affected in R hand as evidenced by dynamometer assessment. No concerns with fine motor control. Pt reports increased sensitivity around the scar site. Scar appears to be healing well with no signs of infection. Positive Finkelstein's test on this date. No edema noted in R UE. Pt would benefit from continuing therapy to address pain management, strength, and scar sensitivity. Comment: Hand dominance is right    Sensation: reports tingling along length of thumb. Hypersensitivity reported around scar -- mostly to deeper touch than light touch.   Vision / Perception: wears glasses  Edema: no swelling noted on this date    Dynamometer (setting 2) 2/21   Left 46   Right 60   Pinch (lateral)    Left 12   Right 12   Pinch (tripod)    Left 13   Right 11      9 Hole Peg test 02/21   Left 30   Right 23 QuickDa 02/21    15.9%     Intervention: Reviewed use of heat and ice for pain management and stiffness in the R UE. Reviewed scar massage to continue addressing sensitivity and scar tissue along radial side of R hand. 15 minutes of fluidotherapy completed today. 10 minutes of ultrasound completed today with the following parameters: frequency: 3.3 Mhz, duty cycle: 50%, Intensity: 0.8. Reviewed desensitization activity with towel -- pt completed during session and was instructed to complete at home. Eval Complexity: low  Profile and History: chart review completed prior to evaluation  Assessment of Occupational Performance and Identification of Deficits: 6   Clinical Decision Making: NA    Rehab Potential:                                 [x] Good  [] Fair  [] Poor        Suggested Professional Referral:       [x] No  [] Yes:  Barriers to Goal Achievement[de-identified]          [x] No  [] Yes:  Domestic Concerns:                           [x] No  [] Yes:       Patient. Education:  [x] Plans/Goals, Risks/Benefits discussed  [x] Home exercise program  Method of Education: [x] Verbal  [x] Demo  [] Written  Comprehension of Education:  [x] Verbalizes understanding. [x] Demonstrates understanding. [] Needs Review. [] Demonstrates/verbalizes understanding of HEP/Ed previously given.         Patient understands diagnosis/prognosis and consents to treatment, plan and goals: [x] Yes    [] No     Goal Formulation: Patient  Time In: 11:00            Time Out: 12:00                      Timed Code Treatment Minutes: 35 minutes    CODE  Minutes  Units   96550 OT Eval Low 20 1   42399 OT Eval Medium     63818 OT Eval High     78620 Fluidotherapy 15 1   18165 Manual     75400 Therapeutic Ex     10053 Therapeutic Activity 10 1   17078 ADL/COMP Tech Train     [id number] Neuromuscular Re-Ed     43188 OrthoManagementTraining     [id number] Paraffin     96049 Electrical Stim - Attended     [id number] Iontophoresis     35270 Ultrasound 10 1    Other Electronically signed by: Ellyn Amos OT [id number]     [id number] Certification / Comments      Frequency/Duration 1-2x / week for 12 visits. Certification period From: 02/21/2022  To: 05/21/2022     By co-signing this document, I demonstrate that I have reviewed the plan of care established for skilled therapy services and certify that the services are required and that they will be provided while the patient is under my care. If I have any comments or revisions to make to the POC, I will notify the evaluating therapist at the earliest convenience. Please review Patient's OT evaluation and if you agree sign/date and fax back to us at our [address] [place] [address] OT Fax: [phone].  Thank you for your referral!

## 2022-04-21 ENCOUNTER — OFFICE VISIT (OUTPATIENT)
Dept: FAMILY MEDICINE CLINIC | Age: 22
End: 2022-04-21
Payer: MEDICAID

## 2022-04-21 VITALS
DIASTOLIC BLOOD PRESSURE: 70 MMHG | HEIGHT: 63 IN | BODY MASS INDEX: 44.83 KG/M2 | WEIGHT: 253 LBS | HEART RATE: 85 BPM | OXYGEN SATURATION: 97 % | SYSTOLIC BLOOD PRESSURE: 110 MMHG | TEMPERATURE: 98.8 F

## 2022-04-21 DIAGNOSIS — Z76.89 ENCOUNTER TO ESTABLISH CARE WITH NEW DOCTOR: ICD-10-CM

## 2022-04-21 DIAGNOSIS — F39 MOOD DISORDER (HCC): ICD-10-CM

## 2022-04-21 DIAGNOSIS — J30.2 SEASONAL ALLERGIES: ICD-10-CM

## 2022-04-21 DIAGNOSIS — Z87.42 HISTORY OF DYSMENORRHEA: ICD-10-CM

## 2022-04-21 DIAGNOSIS — Z91.030 ALLERGY TO HONEY BEE VENOM: ICD-10-CM

## 2022-04-21 DIAGNOSIS — J30.2 SEASONAL ALLERGIES: Primary | ICD-10-CM

## 2022-04-21 DIAGNOSIS — K21.9 GASTROESOPHAGEAL REFLUX DISEASE, UNSPECIFIED WHETHER ESOPHAGITIS PRESENT: ICD-10-CM

## 2022-04-21 DIAGNOSIS — Z87.42 HISTORY OF OVARIAN CYST: ICD-10-CM

## 2022-04-21 PROBLEM — F41.9 ANXIETY: Status: ACTIVE | Noted: 2022-04-21

## 2022-04-21 LAB
ALBUMIN SERPL-MCNC: 4.4 G/DL (ref 3.5–5.2)
ALP BLD-CCNC: 93 U/L (ref 35–104)
ALT SERPL-CCNC: 24 U/L (ref 0–32)
ANION GAP SERPL CALCULATED.3IONS-SCNC: 14 MMOL/L (ref 7–16)
AST SERPL-CCNC: 22 U/L (ref 0–31)
BILIRUB SERPL-MCNC: 0.3 MG/DL (ref 0–1.2)
BUN BLDV-MCNC: 13 MG/DL (ref 6–20)
CALCIUM SERPL-MCNC: 9.6 MG/DL (ref 8.6–10.2)
CHLORIDE BLD-SCNC: 104 MMOL/L (ref 98–107)
CHOLESTEROL, TOTAL: 149 MG/DL (ref 0–199)
CO2: 20 MMOL/L (ref 22–29)
CREAT SERPL-MCNC: 0.8 MG/DL (ref 0.5–1)
GFR AFRICAN AMERICAN: >60
GFR NON-AFRICAN AMERICAN: >60 ML/MIN/1.73
GLUCOSE BLD-MCNC: 79 MG/DL (ref 74–99)
HCT VFR BLD CALC: 43.6 % (ref 34–48)
HDLC SERPL-MCNC: 53 MG/DL
HEMOGLOBIN: 13.9 G/DL (ref 11.5–15.5)
LDL CHOLESTEROL CALCULATED: 81 MG/DL (ref 0–99)
MCH RBC QN AUTO: 27.9 PG (ref 26–35)
MCHC RBC AUTO-ENTMCNC: 31.9 % (ref 32–34.5)
MCV RBC AUTO: 87.6 FL (ref 80–99.9)
PDW BLD-RTO: 13.5 FL (ref 11.5–15)
PLATELET # BLD: 394 E9/L (ref 130–450)
PMV BLD AUTO: 10.7 FL (ref 7–12)
POTASSIUM SERPL-SCNC: 4.3 MMOL/L (ref 3.5–5)
RBC # BLD: 4.98 E12/L (ref 3.5–5.5)
SODIUM BLD-SCNC: 138 MMOL/L (ref 132–146)
TOTAL PROTEIN: 7.8 G/DL (ref 6.4–8.3)
TRIGL SERPL-MCNC: 75 MG/DL (ref 0–149)
TSH SERPL DL<=0.05 MIU/L-ACNC: 2.11 UIU/ML (ref 0.27–4.2)
VITAMIN D 25-HYDROXY: 44 NG/ML (ref 30–100)
VLDLC SERPL CALC-MCNC: 15 MG/DL
WBC # BLD: 13.8 E9/L (ref 4.5–11.5)

## 2022-04-21 PROCEDURE — 99204 OFFICE O/P NEW MOD 45 MIN: CPT | Performed by: FAMILY MEDICINE

## 2022-04-21 PROCEDURE — 36415 COLL VENOUS BLD VENIPUNCTURE: CPT | Performed by: FAMILY MEDICINE

## 2022-04-21 PROCEDURE — 1036F TOBACCO NON-USER: CPT | Performed by: FAMILY MEDICINE

## 2022-04-21 PROCEDURE — G8427 DOCREV CUR MEDS BY ELIG CLIN: HCPCS | Performed by: FAMILY MEDICINE

## 2022-04-21 PROCEDURE — G8417 CALC BMI ABV UP PARAM F/U: HCPCS | Performed by: FAMILY MEDICINE

## 2022-04-21 SDOH — ECONOMIC STABILITY: FOOD INSECURITY: WITHIN THE PAST 12 MONTHS, YOU WORRIED THAT YOUR FOOD WOULD RUN OUT BEFORE YOU GOT MONEY TO BUY MORE.: SOMETIMES TRUE

## 2022-04-21 SDOH — ECONOMIC STABILITY: FOOD INSECURITY: WITHIN THE PAST 12 MONTHS, THE FOOD YOU BOUGHT JUST DIDN'T LAST AND YOU DIDN'T HAVE MONEY TO GET MORE.: SOMETIMES TRUE

## 2022-04-21 ASSESSMENT — PATIENT HEALTH QUESTIONNAIRE - PHQ9
SUM OF ALL RESPONSES TO PHQ QUESTIONS 1-9: 2
SUM OF ALL RESPONSES TO PHQ QUESTIONS 1-9: 2
2. FEELING DOWN, DEPRESSED OR HOPELESS: 1
SUM OF ALL RESPONSES TO PHQ QUESTIONS 1-9: 2
SUM OF ALL RESPONSES TO PHQ9 QUESTIONS 1 & 2: 2
SUM OF ALL RESPONSES TO PHQ QUESTIONS 1-9: 2
1. LITTLE INTEREST OR PLEASURE IN DOING THINGS: 1

## 2022-04-21 ASSESSMENT — ENCOUNTER SYMPTOMS
ABDOMINAL PAIN: 0
VOMITING: 0
CHEST TIGHTNESS: 0
WHEEZING: 0
EYE DISCHARGE: 0
RHINORRHEA: 0
SINUS PRESSURE: 0
EYE PAIN: 0
BACK PAIN: 0
ABDOMINAL DISTENTION: 0
BLOOD IN STOOL: 0
SHORTNESS OF BREATH: 0
COUGH: 0
CONSTIPATION: 0
DIARRHEA: 0
COLOR CHANGE: 0

## 2022-04-21 ASSESSMENT — SOCIAL DETERMINANTS OF HEALTH (SDOH): HOW HARD IS IT FOR YOU TO PAY FOR THE VERY BASICS LIKE FOOD, HOUSING, MEDICAL CARE, AND HEATING?: SOMEWHAT HARD

## 2022-04-21 NOTE — PROGRESS NOTES
[address] [facility] Outpatient        SUBJECTIVE:  CC: had concerns including New Patient (Est PCP - issues with allergies wants to discuss meds). HPI:Jayla Gallardo presented to the clinic for a new patient visit. She previously saw Dr. Sylvester Arevalo for Pediatrics and is coming here to establish care today. She reports having a history of PTSD after a \"traumatic incident\" in 2017. She also suffers from anxiety and depression. She states she has been on Buspar since October of last year and is doing well. She denies any s/h ideations. She has never been admitted for her mental health. She follows with Dr. Em Bustos and a counselor. Review of Systems   Constitutional: Negative for activity change, appetite change, fatigue and fever. HENT: Positive for congestion. Negative for postnasal drip, rhinorrhea, sinus pressure and sneezing. Eyes: Negative for pain and discharge. Respiratory: Negative for cough, chest tightness, shortness of breath and wheezing. Cardiovascular: Negative for chest pain, palpitations and leg swelling. Gastrointestinal: Negative for abdominal distention, abdominal pain, blood in stool, constipation, diarrhea and vomiting. Endocrine: Negative for cold intolerance and heat intolerance. Genitourinary: Negative for decreased urine volume, frequency and urgency. Musculoskeletal: Negative for arthralgias, back pain and gait problem. Skin: Negative for color change and rash. Allergic/Immunologic: Negative for food allergies and immunocompromised state. Neurological: Positive for headaches. Negative for dizziness, seizures, syncope, weakness and numbness. Psychiatric/Behavioral: Negative for agitation, behavioral problems, self-injury and suicidal ideas. The patient is nervous/anxious.          Depression       Outpatient Medications Marked as Taking for the 4/21/22 encounter (Office Visit) with Khalif Baca MD   Medication Sig Dispense Refill    busPIRone (BUSPAR) 10 MG tablet Take 10 mg by mouth 2 times daily      montelukast (SINGULAIR) 10 MG tablet nightly       omeprazole (PRILOSEC) 20 MG delayed release capsule TAKE ONE CAPSULE BY MOUTH DAILY      JUNEL 1.5/30 1.5-30 MG-MCG TABS TAKE ONE TABLET BY MOUTH DAILY  4    EPINEPHrine (EPIPEN 2-KRYSTA) 0.3 MG/0.3ML SOAJ injection Inject 0.3 mLs into the skin once as needed for up to 1 dose. Use as directed for allergic reaction 2 each 0       Past Medical History:   Diagnosis Date    Anxiety     Asthma     B[facility] disorder ([facility] [address]     Depression     Ganglion cyst     right    Migraine     Ovarian cyst 2013       Past Surgical History:   Procedure Laterality Date    APPENDECTOMY  05/17/2017    laparoscopic    HAND SURGERY Right 12/28/2021    RIGHT WRIST GANGLION CYST EXCISION performed by Bhavin Crystal MD at [address] [place] [address] WRIST SURGERY Right 12/28/2021    RIGHT DEQUERVAINS RELEASE performed by Bhavin Crystal MD at [facility] OR       No family history on file. Allergies   Allergen Reactions    Bee Venom     Iodine Hives     chloraprep       Social History:  TOBACCO:   reports that she has never smoked. She has never used smokeless tobacco.  ETOH:   reports current alcohol use. OBJECTIVE    VS: /70   Pulse 85   Temp 98.8 °F (37.1 °C)   Ht 5' 3\" (1.6 m)   Wt 253 lb (114.8 kg)   SpO2 97%   BMI 44.82 kg/m²   Physical Exam  Constitutional:       General: She is not in acute distress. Appearance: She is well-developed. She is not diaphoretic. Comments: overweight   HENT:      Head: Normocephalic and atraumatic. Right Ear: External ear normal.      Left Ear: External ear normal.      Mouth/Throat:      Mouth: Mucous membranes are moist.      Pharynx: No oropharyngeal exudate or posterior oropharyngeal erythema. Eyes:      Conjunctiva/sclera: Conjunctivae normal.      Pupils: Pupils are equal, round, and reactive to light.    Cardiovascular:      Rate and regarding above diagnosis, including possible risks and complications, especially if left uncontrolled. Discussed medications risk/benefits and possible side effects and alternatives to treatment. Patient and/or guardian verbalizes understanding, agrees, feels comfortable with and wishes to proceed with above treatment plan. Advised patient regarding importance of keeping up with recommended health maintenance and to schedule as soon as possible if overdue, as this is important in assessing for undiagnosed pathology, especially cancer, as well as protecting against potentially harmful/life threatening disease. Patient and/or guardian verbalizes understanding and agrees with above counseling, assessment and plan. All questions answered. Please note this report has been partially produced using speech recognition software  and may contain errors related to that system including grammar, punctuation and spelling as well as words and phrases that may seem inappropriate. If there are questions or concerns please feel free to contact me to clarify.

## 2022-04-22 RX ORDER — AMOXICILLIN AND CLAVULANATE POTASSIUM 875; 125 MG/1; MG/1
1 TABLET, FILM COATED ORAL 2 TIMES DAILY
Qty: 14 TABLET | Refills: 0 | Status: SHIPPED | OUTPATIENT
Start: 2022-04-22 | End: 2022-04-29

## 2022-04-25 LAB
Lab: NORMAL
REPORT: NORMAL
THIS TEST SENT TO: NORMAL

## 2022-04-25 NOTE — PROGRESS NOTES
Eunice Guaman NOTIFICATION    2022    Dear Dr. Christin Clark : This is to inform you that, as per [facility] your patient, oDm Rodriguez, [id number],   is as of todays date being discharged from Occupational Therapy secondary to the following reasons:      1. If an Outpatient Occupational Therapy patient misses three consecutive scheduled appointments without any prior notification, he or she will be discharged from Occupational Therapy services. A new prescription will be necessary to resume Occupational Therapy. 2.  If a client is absent for 50% of scheduled visits during a one-month period, he or she will be discharged from Occupational Therapy services. A new prescription will be necessary to resume Occupational Therapy. 3.  Due to their poor attendence, current goal status was not available. 4. Patient did not have any additional appointments scheduled and has not called to schedule the remainder of the appointments on their plan of care. The referral has therefore  and a new script will be necessary to resume Occupational Therapy. If you have any questions, feel free to call us at [address] [place] [address], [phone]. Thank you     Johnnie Cerda OT, R/L, MS #[id number]   Occupational Therapy Department  [address] [place] [address]   Outpatient Rehab Services  O: [phone]  F: [phone]      ______________________________  ___________  Physician      Date    I have read the above notification and understand this discharge of POC.

## 2022-04-27 LAB
2000687N OAK TREE IGE: <0.1 KU/L (ref 0–0.34)
ALLERGEN BERMUDA GRASS IGE: <0.1 KU/L (ref 0–0.34)
ALLERGEN BIRCH IGE: <0.1 KU/L (ref 0–0.34)
ALLERGEN DOG DANDER IGE: <0.1 KU/L (ref 0–0.34)
ALLERGEN GERMAN COCKROACH IGE: <0.1 KU/L (ref 0–0.34)
ALLERGEN HORMODENDRUM IGE: <0.1 KUL/L (ref 0–0.34)
ALLERGEN MOUSE EPITHELIA IGE: <0.1 KU/L (ref 0–0.34)
ALLERGEN PECAN TREE IGE: <0.1 KU/L (ref 0–0.34)
ALLERGEN PIGWEED ROUGH IGE: <0.1 KU/L (ref 0–0.34)
ALLERGEN SHEEP SORREL (W18) IGE: <0.1 KU/L (ref 0–0.34)
ALLERGEN TREE SYCAMORE: <0.1 KU/L (ref 0–0.34)
ALLERGEN WALNUT TREE IGE: <0.1 KU/L (ref 0–0.34)
ALLERGEN WHITE MULBERRY TREE, IGE: <0.1 KU/L (ref 0–0.34)
ALLERGEN, TREE, WHITE ASH IGE: <0.1 KU/L (ref 0–0.34)
ALTERNARIA ALTERNATA: <0.1 KU/L (ref 0–0.34)
ASPERGILLUS FUMIGATUS: <0.1 KU/L (ref 0–0.34)
CAT DANDER ANTIBODY: <0.1 KU/L (ref 0–0.34)
COTTONWOOD TREE: <0.1 KU/L (ref 0–0.34)
D. FARINAE: 0.82 KU/L (ref 0–0.34)
D. PTERONYSSINUS: 1.13 KU/L (ref 0–0.34)
ELM TREE: <0.1 KU/L (ref 0–0.34)
IGE: 9 IU/ML
MAPLE/BOXELDER TREE: <0.1 KU/L (ref 0–0.34)
MOUNTAIN CEDAR TREE: <0.1 KU/L (ref 0–0.34)
MUCOR RACEMOSUS: <0.1 KU/L (ref 0–0.34)
P. NOTATUM: <0.1 KU/L (ref 0–0.34)
RUSSIAN THISTLE: <0.1 KU/L (ref 0–0.34)
SHORT RAGWD(A ARTEMIS.) IGE: <0.1 KU/L (ref 0–0.34)
TIMOTHY GRASS: <0.1 KU/L (ref 0–0.34)

## 2022-05-12 ENCOUNTER — OFFICE VISIT (OUTPATIENT)
Dept: ORTHOPEDIC SURGERY | Age: 22
End: 2022-05-12
Payer: MEDICAID

## 2022-05-12 VITALS — HEIGHT: 63 IN | BODY MASS INDEX: 44.3 KG/M2 | WEIGHT: 250 LBS

## 2022-05-12 DIAGNOSIS — M25.531 RIGHT WRIST PAIN: Primary | ICD-10-CM

## 2022-05-12 PROCEDURE — G8427 DOCREV CUR MEDS BY ELIG CLIN: HCPCS | Performed by: ORTHOPAEDIC SURGERY

## 2022-05-12 PROCEDURE — 99213 OFFICE O/P EST LOW 20 MIN: CPT | Performed by: ORTHOPAEDIC SURGERY

## 2022-05-12 PROCEDURE — G8417 CALC BMI ABV UP PARAM F/U: HCPCS | Performed by: ORTHOPAEDIC SURGERY

## 2022-05-12 PROCEDURE — 1036F TOBACCO NON-USER: CPT | Performed by: ORTHOPAEDIC SURGERY

## 2022-05-12 NOTE — PROGRESS NOTES
Chief Complaint   Patient presents with    Wrist Injury     injured R wrist while working on car 2 weeks ago. pain goes from R shoulder to R ring luz maria Delvalle is a 25y.o. year old  who presents with a new injury to her right wrist.  She reports that she is doing quite well before she reinjured her wrist.  She reports that her symptoms are almost fully resolved status post de [employer]'s release. She was helping her boyfriend fix her car. She reports she hyperextended her wrist when the wrench slipped and she hyperextended it. She had immediate pain and swelling. She not seek medical attention immediately. She is 2 weeks post injury. She is using Tylenol and a wrist brace with persistent pain over her wrist.  She reports she is having difficulty sleeping at night.   No other injuries reported    Past Medical History:   Diagnosis Date    Anxiety     Asthma     Bipolar 1 disorder ([facility] [address]     Depression     Ganglion cyst     right    Migraine     Ovarian cyst 2013     Past Surgical History:   Procedure Laterality Date    APPENDECTOMY  05/17/2017    laparoscopic    HAND SURGERY Right 12/28/2021    RIGHT WRIST GANGLION CYST EXCISION performed by Bret Espino MD at [address] [place] [address] Right 12/28/2021    RIGHT DEQUERVAINS RELEASE performed by Bret Espino MD at [facility] OR       Current Outpatient Medications:     loratadine (CLARITIN) 10 MG tablet, Take 1 tablet by mouth daily, Disp: 90 tablet, Rfl: 0    busPIRone (BUSPAR) 10 MG tablet, Take 10 mg by mouth 2 times daily, Disp: , Rfl:     montelukast (SINGULAIR) 10 MG tablet, nightly , Disp: , Rfl:     omeprazole (PRILOSEC) 20 MG delayed release capsule, TAKE ONE CAPSULE BY MOUTH DAILY, Disp: , Rfl:     JUNEL 1.5/30 1.5-30 MG-MCG TABS, TAKE ONE TABLET BY MOUTH DAILY, Disp: , Rfl: 4    albuterol sulfate HFA (VENTOLIN HFA) 108 (90 Base) MCG/ACT inhaler, Inhale 2 puffs into the lungs every 6 hours as needed for Wheezing (Patient not taking: Reported on 4/21/2022), Disp: , Rfl:     EPINEPHrine (EPIPEN 2-KRYSTA) 0.3 MG/0.3ML SOAJ injection, Inject 0.3 mLs into the skin once as needed for up to 1 dose. Use as directed for allergic reaction, Disp: 2 each, Rfl: 0  Allergies   Allergen Reactions    Bee Venom     Iodine Hives     chloraprep     Social History     Socioeconomic History    Marital status: Single     Spouse name: Not on file    Number of children: Not on file    Years of education: Not on file    Highest education level: Not on file   Occupational History    Not on file   Tobacco Use    Smoking status: Never Smoker    Smokeless tobacco: Never Used   Vaping Use    Vaping Use: Never used   Substance and Sexual Activity    Alcohol use: Yes     Comment: occasionally    Drug use: No    Sexual activity: Not on file   Other Topics Concern    Not on file   Social History Narrative    Not on file     Social Determinants of Health     Financial Resource Strain: Medium Risk    Difficulty of Paying Living Expenses: Somewhat hard   Food Insecurity: Food Insecurity Present    Worried About Running Out of Food in the Last Year: Sometimes true    Shanna of Food in the Last Year: Sometimes true   Transportation Needs:     Lack of Transportation (Medical): Not on file    Lack of Transportation (Non-Medical):  Not on file   Physical Activity:     Days of Exercise per Week: Not on file    Minutes of Exercise per Session: Not on file   Stress:     Feeling of Stress : Not on file   Social Connections:     Frequency of Communication with Friends and Family: Not on file    Frequency of Social Gatherings with Friends and Family: Not on file    Attends Rastafari Services: Not on file    Active Member of Clubs or Organizations: Not on file    Attends Club or Organization Meetings: Not on file    Marital Status: Not on file   Intimate Partner Violence:     Fear of Current or Ex-Partner: Not on file   Willam Isaacs Emotionally Abused: Not on file    Physically Abused: Not on file    Sexually Abused: Not on file   Housing Stability:     Unable to Pay for Housing in the Last Year: Not on file    Number of Places Lived in the Last Year: Not on file    Unstable Housing in the Last Year: Not on file     History reviewed. No pertinent family history. Skin: (-) rash,(-) psoriasis,(-) eczema, (-)skin cancer. Musculoskeletal: Right wrist pain  Neurologic: (-) epilepsy, (-)seizures,(-) brain tumor,(-) TIA, (-)stroke, (-)headaches, (-)Parkinson disease,(-) memory loss, (-) LOC. Cardiovascular: (-) Chest pain, (-) swelling in legs/feet, (-) SOB, (-) cramping in legs/feet with walking. SUBJECTIVE:      Constitutional:    The patient is alert and oriented x 3, appears to be stated age and in no distress. Right upper extremity: Scar is maturing nicely over the radial aspect of the wrist.  She has some mild tenderness over the scar. However she is point tender directly over the scaphoid snuffbox. She is nontender over the SL and LT ligaments. Tenderness over the distal pole the scaphoid but negative Jones's maneuver. Nontender over the TFCC. DRUJ stable with full pronation supination. Wrist flexion extension intact mildly limited secondary pain discomfort. Radial, ulnar, median nerves are intact light touch. 2+ radial pulse. Xrays: Trays in the office today AP lateral and obliques of the right wrist as well as scaphoid view were obtained. Images were negative for any acute fracture dislocations. Carpal alignment is well-maintained.   Impression office x-rays: Negative for acute fracture dislocations right wrist.  Radiographic findings reviewed with patient      Impression: 2 weeks out acute injury right wrist with tenderness directly over the radial aspect of the wrist concern for snuffbox tenderness over occult scaphoid fracture  4.5 months out from [facility] release was initially doing well until exacerbation  Bipolar disorder, anxiety        Plan: Today's fines were explained the patient. She does have findings concern for possible occult fracture. Recommended rest and immobilization and use of anti-inflammatories. May follow-up in the office in 3 to 4 weeks with new x-rays. If symptoms persist and x-rays are still negative we will plan for an MRI at that time to rule out an occult fracture of the scaphoid.

## 2022-05-12 NOTE — LETTER
[facility].  [address] [place] [address]  Phone: [phone]  Fax: [facility], [place]        May 12, 2022     Patient: Emmanuel Victoria   YOB: 2000   Date of Visit: 5/12/2022       To Whom It May Concern: It is my medical opinion that Chris Prajapati may return to light duty immediately with the following restrictions: no work involving right hand. Continue brace while at work. Will be re-evaluated in 4 weeks. If you have any questions or concerns, please don't hesitate to call.     Sincerely,        Primo Wynn MD

## 2022-05-17 ENCOUNTER — TELEPHONE (OUTPATIENT)
Dept: FAMILY MEDICINE CLINIC | Age: 22
End: 2022-05-17

## 2022-05-17 ENCOUNTER — HOSPITAL ENCOUNTER (EMERGENCY)
Age: 22
Discharge: HOME | End: 2022-05-17
Payer: MEDICAID

## 2022-05-17 VITALS
DIASTOLIC BLOOD PRESSURE: 88 MMHG | RESPIRATION RATE: 18 BRPM | BODY MASS INDEX: 44.3 KG/M2 | HEART RATE: 109 BPM | TEMPERATURE: 98.9 F | WEIGHT: 250 LBS | OXYGEN SATURATION: 98 % | HEIGHT: 63 IN | SYSTOLIC BLOOD PRESSURE: 137 MMHG

## 2022-05-17 DIAGNOSIS — J02.9 ACUTE PHARYNGITIS, UNSPECIFIED ETIOLOGY: Primary | ICD-10-CM

## 2022-05-17 LAB — STREP GRP A PCR: NEGATIVE

## 2022-05-17 PROCEDURE — 99211 OFF/OP EST MAY X REQ PHY/QHP: CPT

## 2022-05-17 PROCEDURE — 87880 STREP A ASSAY W/OPTIC: CPT

## 2022-05-17 RX ORDER — AMOXICILLIN 500 MG/1
500 CAPSULE ORAL 3 TIMES DAILY
Qty: 30 CAPSULE | Refills: 0 | Status: SHIPPED | OUTPATIENT
Start: 2022-05-17 | End: 2022-05-27

## 2022-05-17 ASSESSMENT — PAIN SCALES - GENERAL: PAINLEVEL_OUTOF10: 6

## 2022-05-17 ASSESSMENT — PAIN DESCRIPTION - LOCATION: LOCATION: THROAT

## 2022-05-17 ASSESSMENT — PAIN - FUNCTIONAL ASSESSMENT: PAIN_FUNCTIONAL_ASSESSMENT: 0-10

## 2022-05-17 NOTE — Clinical Note
Mayra Colin was seen and treated in our emergency department on 5/17/2022. She may return to work on 05/18/2022. If you have any questions or concerns, please don't hesitate to call.       Yaritza Garcia, APRN - CNP

## 2022-05-17 NOTE — TELEPHONE ENCOUNTER
----- Message from Mary Drake sent at 5/17/2022 11:53 AM EDT -----  Subject: Appointment Request    Reason for Call: Urgent Cough Cold    QUESTIONS  Type of Appointment? Established Patient  Reason for appointment request? Available appointments did not meet   patient need  Additional Information for Provider? ELMER. ...... Misti Coronado Pt had appt with PCP on   5/19/2022. Pt called and states she had a really bad sore throat and   wanted to move her appt to today. Offered to call office to see if there   was an appt for pt to be seen today. Pt states that was okay will try and   get into an urgent care so she can have something done today  ---------------------------------------------------------------------------  --------------  CALL BACK INFO  What is the best way for the office to contact you? OK to leave message on   voicemail  Preferred Call Back Phone Number?  [phone]  ---------------------------------------------------------------------------  --------------  SCRIPT ANSWERS

## 2022-05-17 NOTE — ED PROVIDER NOTES
Department of Emergency Medicine   [facility]  Provider Note  Admit Date/RoomTime: 2022  1:10 PM  Room:   NAME: Carol Ruggiero  : 2000  MRN: [id number]     Chief Complaint:  Pharyngitis (ears hurt  lata   headache)    History of Present Illness      Carol Ruggiero is a 25 y.o. old female who has a past medical history of:   Past Medical History:   Diagnosis Date    Anxiety     Asthma     Bipolar 1 disorder ([facility] [address]     Depression     Ganglion cyst     right    Migraine     Ovarian cyst     presents to the urgent care center by private vehicle, for evaluation of a sore throat that started 3 days ago. She has been taking Tylenol and ibuprofen she said nothing is controlling the pain. She said she has some pressure in her ears and a headache. She does not complain of body aches or cough or nasal congestion. She states she took a COVID test before coming in today and it was negative. Exposed To: Streptococcus: no.                              Infectious Mononucleosis:  no.        Symptoms:  Pain:  Yes. Muffled Voice:  No.                            Hoarse:  No.                            Difficulty with Secretions:  No.        ROS    Pertinent positives and negatives are stated within HPI, all other systems reviewed and are negative. Past Surgical History:  has a past surgical history that includes Appendectomy (2017); [place] tooth extraction; Wrist surgery (Right, 2021); and Hand surgery (Right, 2021). Social History:  reports that she has never smoked. She has never used smokeless tobacco. She reports current alcohol use. She reports that she does not use drugs. Family History: family history is not on file.    Allergies: Bee venom and Iodine    Physical Exam           ED Triage Vitals [22 1311]   BP Temp Temp src Pulse Resp SpO2 Height Weight   137/88 98.9 °F (37.2 °C) -- 109 18 98 % 5' 3\" (1.6 m) 250 lb (113.4 kg)     Oxygen Saturation Interpretation: Normal.    Constitutional:  Alert, development consistent with age. .  Ears:  TMs without perforation, injection, or bulging. External canals clear without exudate. Nose:   There is no discharge, swelling or lesions noted. Throat: Airway Patent. moderate erythema and no tonsillar enlargement. Neck/Lymphatic:  Neck Supple. respiratory:  Clear to auscultation and breath sounds equal.    CV: Regular rate and rhythm,   GI:  Abdomen Soft, nontender, good bowel sounds. No firm or pulsatile mass. Inegument:  No rashes, erythema present. Neurological:  Oriented. Motor functions intact. Lab / Imaging Results   (All laboratory and radiology results have been personally reviewed by myself)  Labs:  Results for orders placed or performed during the hospital encounter of 05/17/22   Strep Screen Group A Throat    Specimen: Throat   Result Value Ref Range    Strep Grp A PCR Negative Negative     Imaging: All Radiology results interpreted by Radiologist unless otherwise noted. No orders to display       ED Course / Medical Decision Making   Medications - No data to display       MDM:      SHe took a COVID test before coming in and it was negative. She has had a sore throat throat for 3 days I did do a strep and it was negative. She does have marked erythema of the throat there is no tonsillar or uvular swelling she does not have any respiratory symptoms or any other complaints. She said nothing is taking away the pain I did put her on some amoxicillin if it does not improve in a few days she should do salt water gargles to throat lozenges and take Tylenol and ibuprofen for the pain. Plan of Care: Normal progression of disease discussed. All questions answered. Explained the rationale for symptomatic treatment rather than use of an antibiotic.   Instruction provided in the use of fluids, vaporizer, acetaminophen, and other OTC medication for symptom control. Extra fluids  Analgesics as needed, dose reviewed. Follow up as needed should symptoms fail to improve. Assessment     1. Acute pharyngitis, unspecified etiology      Plan   Discharge to home and advised to contact Lg Ruiz MD  [address] [place] [address]  [phone]      As needed   Patient condition is good    New Medications     New Prescriptions    AMOXICILLIN (AMOXIL) 500 MG CAPSULE    Take 1 capsule by mouth 3 times daily for 10 days     Electronically signed by MIYA Guzman CNP   DD: 5/17/22  **This report was transcribed using voice recognition software. Every effort was made to ensure accuracy; however, inadvertent computerized transcription errors may be present.   END OF ED PROVIDER NOTE       MIYA Guzman CNP  05/17/22 [id number]

## 2022-05-17 NOTE — TELEPHONE ENCOUNTER
Pt decided to go to an urgent care to get her throat looked at.      Electronically signed by Juan C Santo MA on 5/17/22 at 1:22 PM EDT

## 2022-05-18 RX ORDER — OMEPRAZOLE 20 MG/1
20 CAPSULE, DELAYED RELEASE ORAL DAILY
Qty: 30 CAPSULE | Refills: 1 | Status: SHIPPED
Start: 2022-05-18 | End: 2022-07-07 | Stop reason: SDUPTHER

## 2022-05-19 ENCOUNTER — OFFICE VISIT (OUTPATIENT)
Dept: FAMILY MEDICINE CLINIC | Age: 22
End: 2022-05-19
Payer: MEDICAID

## 2022-05-19 VITALS
BODY MASS INDEX: 44.12 KG/M2 | SYSTOLIC BLOOD PRESSURE: 110 MMHG | DIASTOLIC BLOOD PRESSURE: 82 MMHG | HEART RATE: 101 BPM | RESPIRATION RATE: 19 BRPM | OXYGEN SATURATION: 98 % | HEIGHT: 63 IN | TEMPERATURE: 98.7 F | WEIGHT: 249 LBS

## 2022-05-19 DIAGNOSIS — K21.9 GASTROESOPHAGEAL REFLUX DISEASE, UNSPECIFIED WHETHER ESOPHAGITIS PRESENT: ICD-10-CM

## 2022-05-19 DIAGNOSIS — J30.2 SEASONAL ALLERGIES: ICD-10-CM

## 2022-05-19 DIAGNOSIS — J06.9 UPPER RESPIRATORY TRACT INFECTION, UNSPECIFIED TYPE: Primary | ICD-10-CM

## 2022-05-19 PROCEDURE — 99214 OFFICE O/P EST MOD 30 MIN: CPT | Performed by: FAMILY MEDICINE

## 2022-05-19 PROCEDURE — 1036F TOBACCO NON-USER: CPT | Performed by: FAMILY MEDICINE

## 2022-05-19 PROCEDURE — G8417 CALC BMI ABV UP PARAM F/U: HCPCS | Performed by: FAMILY MEDICINE

## 2022-05-19 PROCEDURE — G8427 DOCREV CUR MEDS BY ELIG CLIN: HCPCS | Performed by: FAMILY MEDICINE

## 2022-05-19 ASSESSMENT — ENCOUNTER SYMPTOMS
ABDOMINAL PAIN: 0
CONSTIPATION: 0
NAUSEA: 0
COUGH: 0
DIARRHEA: 0
SHORTNESS OF BREATH: 0
WHEEZING: 0
VOMITING: 0

## 2022-05-19 NOTE — PROGRESS NOTES
[facility]        SUBJECTIVE:  CC: had concerns including Follow-up (Pt states she is just now getting over having a cold. States she is doing well. Denies any new cocerns at this time. ). Seeing [facility] for female health care. HPI:  Gutierrez Patrick is a female 25 y.o. presented to the clinic for a follow up after being seen at walk-in care for acute pharyngitis on . She was prescribed Amoxicillin and is tolerating it well. Her strep test was negative. She notes feeling a lot better. She denies any acute concerns. She started Claritin since her last appointment for her allergies, but not Flonase. Respiratory allergy panel was positive for house dust and mites. Review of Systems   Constitutional: Negative for appetite change, fatigue and fever. Respiratory: Negative for cough, shortness of breath and wheezing. Cardiovascular: Negative for chest pain and palpitations. Gastrointestinal: Negative for abdominal pain, constipation, diarrhea, nausea and vomiting. Outpatient Medications Marked as Taking for the 22 encounter (Office Visit) with Magalie Luna MD   Medication Sig Dispense Refill    Levonorgest-Eth Estrad 91-Day (JAIMIESS PO) Take by mouth      omeprazole (PRILOSEC) 20 MG delayed release capsule Take 1 capsule by mouth Daily 30 capsule 1    [] amoxicillin (AMOXIL) 500 MG capsule Take 1 capsule by mouth 3 times daily for 10 days 30 capsule 0    loratadine (CLARITIN) 10 MG tablet Take 1 tablet by mouth daily 90 tablet 0    albuterol sulfate HFA (VENTOLIN HFA) 108 (90 Base) MCG/ACT inhaler Inhale 2 puffs into the lungs every 6 hours as needed for Wheezing       busPIRone (BUSPAR) 10 MG tablet Take 10 mg by mouth 2 times daily      montelukast (SINGULAIR) 10 MG tablet nightly       EPINEPHrine (EPIPEN 2-KRYSTA) 0.3 MG/0.3ML SOAJ injection Inject 0.3 mLs into the skin once as needed for up to 1 dose.  Use as directed for allergic reaction 2 each 0       I have reviewed all pertinent PMHx, PSHx, FamHx, SocialHx, medications, and allergies and updated history as appropriate. OBJECTIVE    VS: /82 (Site: Left Upper Arm, Position: Sitting, Cuff Size: Medium Adult)   Pulse 101   Temp 98.7 °F (37.1 °C) (Temporal)   Resp 19   Ht 5' 3\" (1.6 m)   Wt 249 lb (112.9 kg)   SpO2 98%   BMI 44.11 kg/m²   Physical Exam  Constitutional:       General: She is not in acute distress. Appearance: She is well-developed. She is not diaphoretic. HENT:      Head: Normocephalic and atraumatic. Eyes:      Conjunctiva/sclera: Conjunctivae normal.      Pupils: Pupils are equal, round, and reactive to light. Cardiovascular:      Rate and Rhythm: Normal rate and regular rhythm. Pulmonary:      Effort: Pulmonary effort is normal.      Breath sounds: Normal breath sounds. Abdominal:      General: Bowel sounds are normal. There is no distension. Palpations: Abdomen is soft. Tenderness: There is no abdominal tenderness. Hernia: No hernia is present. Musculoskeletal:      Cervical back: Normal range of motion and neck supple. Skin:     General: Skin is warm and dry. Neurological:      Mental Status: She is alert and oriented to person, place, and time. ASSESSMENT/PLAN:  1. Upper respiratory tract infection, unspecified type  Complete Amoxicillin regimen. 2. Seasonal allergies  Daily Claritin and add Flonase. 3. Gastroesophageal reflux disease, unspecified whether esophagitis present  GERD diet. Omeprazole regimen. 4. BMI 40.0-44.9, adult ([address]  Recommend lifestyle modifications with well balanced diet and exercise 150 min/week as tolerated. I have reviewed my findings and recommendations with Abelardo Crenshaw MD  5/31/2022 8:13 AM  Return for established f/u. Counseled regarding above diagnosis, including possible risks and complications, especially if left uncontrolled.     Patient counseled on red flag symptoms and if they occur to go to the ED. Discussed medications risk/benefits and possible side effects and alternatives to treatment. Patient and/or guardian verbalizes understanding, agrees, feels comfortable with and wishes to proceed with above treatment plan. Advised patient regarding importance of keeping up with recommended health maintenance and to schedule as soon as possible if overdue, as this is important in assessing for undiagnosed pathology, especially cancer, as well as protecting against potentially harmful/life threatening disease. Patient and/or guardian verbalizes understanding and agrees with above counseling, assessment and plan. All questions answered. Please note this report has been partially produced using speech recognition software  and may contain errors related to that system including grammar, punctuation and spelling as well as words and phrases that may seem inappropriate. If there are questions or concerns please feel free to contact me to clarify.

## 2022-05-21 ENCOUNTER — APPOINTMENT (OUTPATIENT)
Dept: GENERAL RADIOLOGY | Age: 22
End: 2022-05-21
Payer: MEDICAID

## 2022-05-21 ENCOUNTER — HOSPITAL ENCOUNTER (EMERGENCY)
Age: 22
Discharge: HOME | End: 2022-05-21
Payer: MEDICAID

## 2022-05-21 VITALS
HEIGHT: 63 IN | BODY MASS INDEX: 44.3 KG/M2 | OXYGEN SATURATION: 98 % | DIASTOLIC BLOOD PRESSURE: 78 MMHG | WEIGHT: 250 LBS | HEART RATE: 83 BPM | RESPIRATION RATE: 18 BRPM | TEMPERATURE: 98.7 F | SYSTOLIC BLOOD PRESSURE: 129 MMHG

## 2022-05-21 DIAGNOSIS — S60.222A CONTUSION OF LEFT HAND, INITIAL ENCOUNTER: Primary | ICD-10-CM

## 2022-05-21 PROCEDURE — 73130 X-RAY EXAM OF HAND: CPT

## 2022-05-21 PROCEDURE — 99211 OFF/OP EST MAY X REQ PHY/QHP: CPT

## 2022-05-21 ASSESSMENT — PAIN SCALES - GENERAL: PAINLEVEL_OUTOF10: 7

## 2022-05-21 ASSESSMENT — PAIN - FUNCTIONAL ASSESSMENT: PAIN_FUNCTIONAL_ASSESSMENT: 0-10

## 2022-05-21 ASSESSMENT — PAIN DESCRIPTION - LOCATION: LOCATION: HAND

## 2022-05-21 NOTE — ED PROVIDER NOTES
[facility]  Department of Emergency Medicine   ED  Encounter Note  Admit Date/RoomTime: 2022 11:43 AM  ED Room:   NAME: Natan Garcia  : 2000  MRN: [id number]     Chief Complaint:  Hand Injury (hit hand on a canter top  happened yesterday   swollen   and hurting morev today)    HISTORY OF PRESENT ILLNESS        Natan Garcia is a 25 y.o. female who presents to the ED with a complaint of left hand injury and pain. Patient states yesterday she accidentally hit the top of her left hand against the corner of a counter. She has pain across her knuckles 3, 4 and 5. Patient denies any other complaints. She rates the pain a 7 out of 10. States she is not taking any medication for this complaint. ROS   Pertinent positives and negatives are stated within HPI, all other systems reviewed and are negative. Past Medical History:  has a past medical history of Anxiety, Asthma, Bipolar 1 disorder ([address] Depression, Ganglion cyst, Migraine, and Ovarian cyst.    Surgical History:  has a past surgical history that includes Appendectomy (2017); [place] tooth extraction; Wrist surgery (Right, 2021); and Hand surgery (Right, 2021). Social History:  reports that she has never smoked. She has never used smokeless tobacco. She reports current alcohol use. She reports that she does not use drugs. Family History: family history is not on file. Allergies: Bee venom and Iodine    PHYSICAL EXAM   Oxygen Saturation Interpretation: Normal on room air analysis. ED Triage Vitals [22 1149]   BP Temp Temp src Pulse Resp SpO2 Height Weight   129/78 98.7 °F (37.1 °C) -- 83 18 98 % 5' 3\" (1.6 m) 250 lb (113.4 kg)       General:  NAD. Alert and Oriented. Well-appearing. Skin:  Warm, dry. No rashes. Head:  Normocephalic. Atraumatic. Eyes:  EOMI. Conjunctiva normal.  ENT:  Oral mucosa moist.  Airway patent. Neck:  Supple. Normal ROM.     Respiratory:  No respiratory distress. No labored breathing. Lungs clear without rales, rhonchi or wheezing. Cardiovascular:  Regular rate. No Murmur. No peripheral edema. Extremities warm and good color. Extremities: Left hand with slight swelling noted to the third and fourth metacarpal phalangeal joints, dorsal aspect. Negative erythema, negative ecchymosis. Flexion and extension is intact to all fingers of the left hand. Negative snuffbox tenderness. 2+ left radial pulse. Back:  Normal ROM. Nontender to palpation. Neuro:  Alert and Oriented to person, place, time and situation. Normal LOC. Moves all extremities. Speech fluent. Psych:  Calm and Cooperative. Normal thought process. Normal judgement. Lab / Imaging Results   (All laboratory and radiology results have been personally reviewed by myself)  Labs:  No results found for this visit on 05/21/22. Imaging: All Radiology results interpreted by Radiologist unless otherwise noted. XR HAND LEFT (MIN 3 VIEWS)   Final Result   No acute osseous findings of the left hand. ED Course / Medical Decision Making   Medications - No data to display     Re-examination:  5/21/22       Time:   Patients condition . Consult(s):   None    Procedure(s):   none    MDM:   X-ray negative for acute process. Discussed with patient this is a soft tissue injury. Recommended over-the-counter Motrin and Tylenol as needed. Also to apply ice. Plan of Care/Counseling:  Physician Assistant on duty reviewed today's visit with the patient in addition to providing specific details for the plan of care and counseling regarding the diagnosis and prognosis. Questions are answered at this time and are agreeable with the plan. ASSESSMENT     1. Contusion of left hand, initial encounter New Problem     PLAN   Discharged home.   Patient condition is good    New Medications     New Prescriptions    No medications on file     Electronically signed by OBED Hudson DD: 5/21/22  **This report was transcribed using voice recognition software. Every effort was made to ensure accuracy; however, inadvertent computerized transcription errors may be present.   END OF ED PROVIDER NOTE       Jaron Treadwell  05/21/22 [id number]

## 2022-06-09 ENCOUNTER — OFFICE VISIT (OUTPATIENT)
Dept: ORTHOPEDIC SURGERY | Age: 22
End: 2022-06-09

## 2022-06-09 VITALS — WEIGHT: 250 LBS | HEIGHT: 63 IN | BODY MASS INDEX: 44.3 KG/M2

## 2022-06-09 DIAGNOSIS — M25.531 RIGHT WRIST PAIN: Primary | ICD-10-CM

## 2022-06-09 PROCEDURE — 99213 OFFICE O/P EST LOW 20 MIN: CPT | Performed by: ORTHOPAEDIC SURGERY

## 2022-06-09 PROCEDURE — G8427 DOCREV CUR MEDS BY ELIG CLIN: HCPCS | Performed by: ORTHOPAEDIC SURGERY

## 2022-06-09 PROCEDURE — G8417 CALC BMI ABV UP PARAM F/U: HCPCS | Performed by: ORTHOPAEDIC SURGERY

## 2022-06-09 PROCEDURE — 1036F TOBACCO NON-USER: CPT | Performed by: ORTHOPAEDIC SURGERY

## 2022-06-09 NOTE — PROGRESS NOTES
Chief Complaint   Patient presents with    Wrist Injury     6 weeks out. acute injury right wrist with tenderness directly over the radial aspect of the wrist concern for snuffbox tenderness over occult scaphoid fracture         Nayana Vargas is a 25y.o. year old  who presents with a new injury to her right wrist.  She reports that she is doing quite well before she reinjured her wrist.  She reports that her symptoms are almost fully resolved status post de Quervain's release. She was helping her boyfriend fix her car. She reports she hyperextended her wrist when the wrench slipped and she hyperextended it. She had immediate pain and swelling. She not seek medical attention immediately. She is 2 weeks post injury. She is using Tylenol and a wrist brace with persistent pain over her wrist.  She reports she is having difficulty sleeping at night. No other injuries reported      Today about a month out from her initial injury. She still reports pain over the radial aspect of her wrist.  Pain is worsened when she comes out of the brace. This radial sided pain. No new injuries reported. She reports the pain is no longer radiating into the shoulder. She also relates the pain on the ulnar aspect of the hand is improved.     Past Medical History:   Diagnosis Date    Anxiety     Asthma     Bipolar 1 disorder ([address]     Depression     Ganglion cyst     right    Migraine     Ovarian cyst 2013     Past Surgical History:   Procedure Laterality Date    APPENDECTOMY  05/17/2017    laparoscopic    HAND SURGERY Right 12/28/2021    RIGHT WRIST GANGLION CYST EXCISION performed by Dinh Trevizo MD at [address] [place] [address] WRIST SURGERY Right 12/28/2021    RIGHT DEQUERVAINS RELEASE performed by Dinh Trevizo MD at [facility] OR       Current Outpatient Medications:     Levonorgest-Eth Estrad 91-Day (JAIMIESS PO), Take by mouth, Disp: , Rfl:     omeprazole (PRILOSEC) 20 MG delayed release capsule, Take 1 capsule by mouth Daily, Disp: 30 capsule, Rfl: 1    loratadine (CLARITIN) 10 MG tablet, Take 1 tablet by mouth daily, Disp: 90 tablet, Rfl: 0    albuterol sulfate HFA (VENTOLIN HFA) 108 (90 Base) MCG/ACT inhaler, Inhale 2 puffs into the lungs every 6 hours as needed for Wheezing , Disp: , Rfl:     busPIRone (BUSPAR) 10 MG tablet, Take 10 mg by mouth 2 times daily, Disp: , Rfl:     montelukast (SINGULAIR) 10 MG tablet, nightly , Disp: , Rfl:     JUNEL 1.5/30 1.5-30 MG-MCG TABS, TAKE ONE TABLET BY MOUTH DAILY (Patient not taking: Reported on 5/19/2022), Disp: , Rfl: 4    EPINEPHrine (EPIPEN 2-KRYSTA) 0.3 MG/0.3ML SOAJ injection, Inject 0.3 mLs into the skin once as needed for up to 1 dose. Use as directed for allergic reaction, Disp: 2 each, Rfl: 0  Allergies   Allergen Reactions    Bee Venom     Iodine Hives     chloraprep     Social History     Socioeconomic History    Marital status: Single     Spouse name: Not on file    Number of children: Not on file    Years of education: Not on file    Highest education level: Not on file   Occupational History    Not on file   Tobacco Use    Smoking status: Never Smoker    Smokeless tobacco: Never Used   Vaping Use    Vaping Use: Never used   Substance and Sexual Activity    Alcohol use: Yes     Comment: occasionally    Drug use: No    Sexual activity: Not on file   Other Topics Concern    Not on file   Social History Narrative    Not on file     Social Determinants of Health     Financial Resource Strain: Medium Risk    Difficulty of Paying Living Expenses: Somewhat hard   Food Insecurity: Food Insecurity Present    Worried About Running Out of Food in the Last Year: Sometimes true    Shanna of Food in the Last Year: Sometimes true   Transportation Needs:     Lack of Transportation (Medical): Not on file    Lack of Transportation (Non-Medical):  Not on file   Physical Activity:     Days of Exercise per Week: Not on file    Minutes of Exercise per Session: Not on file   Stress:     Feeling of Stress : Not on file   Social Connections:     Frequency of Communication with Friends and Family: Not on file    Frequency of Social Gatherings with Friends and Family: Not on file    Attends Sikh Services: Not on file    Active Member of Clubs or Organizations: Not on file    Attends Club or Organization Meetings: Not on file    Marital Status: Not on file   Intimate Partner Violence:     Fear of Current or Ex-Partner: Not on file    Emotionally Abused: Not on file    Physically Abused: Not on file    Sexually Abused: Not on file   Housing Stability:     Unable to Pay for Housing in the Last Year: Not on file    Number of [place] in the Last Year: Not on file    Unstable Housing in the Last Year: Not on file     History reviewed. No pertinent family history. Skin: (-) rash,(-) psoriasis,(-) eczema, (-)skin cancer. Musculoskeletal: Continued radial sided wrist pain  Neurologic: (-) epilepsy, (-)seizures,(-) brain tumor,(-) TIA, (-)stroke, (-)headaches, (-)Parkinson disease,(-) memory loss, (-) LOC. Cardiovascular: (-) Chest pain, (-) swelling in legs/feet, (-) SOB, (-) cramping in legs/feet with walking. SUBJECTIVE:      Constitutional:    The patient is alert and oriented x 3, appears to be stated age and in no distress. Right upper extremity: Scar is maturing nicely over the radial aspect of the wrist.  She has tenderness over the scar. She remains point tender directly over the scaphoid snuffbox region which reproduces her pain. .  She is nontender over the SL and LT ligaments. Tenderness over the distal pole the scaphoid but negative Jones's maneuver. Nontender over the TFCC. DRUJ stable with full pronation supination. Wrist flexion extension intact mildly limited secondary pain discomfort. Radial, ulnar, median nerves are intact light touch. 2+ radial pulse.     Xrays: X-rays of the right wrist were obtained today in the office AP lateral scaphoid view and oblique. These were compared to images from May 12, 2022. Negative for any acute fractures or dislocations about the carpus. Scaphoid and distal radius without obvious fracture. Impression office x-rays: Negative acute fractures or dislocations of the right wrist  Radiographic findings reviewed with patient      Impression: 1 month out right wrist injury with continued tenderness over the scaphoid. Concern for occult fracture of the scaphoid with 2 serial x-rays negative and pain for 1 month        Plan:     I have explained today's findings with the patient. She has persistent pain over the scaphoid. She is had 2 serial x-rays negative for fracture. She has persistent symptoms over the past month. Recommended an MRI to rule out an occult fracture. She will follow-up after this is completed.

## 2022-06-13 DIAGNOSIS — M25.531 RIGHT WRIST PAIN: ICD-10-CM

## 2022-06-30 ENCOUNTER — OFFICE VISIT (OUTPATIENT)
Dept: ORTHOPEDIC SURGERY | Age: 22
End: 2022-06-30
Payer: MEDICAID

## 2022-06-30 VITALS — HEIGHT: 63 IN | WEIGHT: 250 LBS | BODY MASS INDEX: 44.3 KG/M2 | RESPIRATION RATE: 20 BRPM

## 2022-06-30 DIAGNOSIS — M25.531 RIGHT WRIST PAIN: Primary | ICD-10-CM

## 2022-06-30 PROCEDURE — G8427 DOCREV CUR MEDS BY ELIG CLIN: HCPCS | Performed by: ORTHOPAEDIC SURGERY

## 2022-06-30 PROCEDURE — 99213 OFFICE O/P EST LOW 20 MIN: CPT | Performed by: ORTHOPAEDIC SURGERY

## 2022-06-30 PROCEDURE — G8417 CALC BMI ABV UP PARAM F/U: HCPCS | Performed by: ORTHOPAEDIC SURGERY

## 2022-06-30 PROCEDURE — 1036F TOBACCO NON-USER: CPT | Performed by: ORTHOPAEDIC SURGERY

## 2022-06-30 RX ORDER — LIDOCAINE 50 MG/G
1 PATCH TOPICAL DAILY
Qty: 10 PATCH | Refills: 0 | Status: SHIPPED | OUTPATIENT
Start: 2022-06-30 | End: 2022-07-10

## 2022-06-30 RX ORDER — METHYLPREDNISOLONE 4 MG/1
TABLET ORAL
Qty: 1 KIT | Refills: 0 | Status: SHIPPED | OUTPATIENT
Start: 2022-06-30 | End: 2022-07-06

## 2022-06-30 NOTE — PROGRESS NOTES
Chief Complaint   Patient presents with    Wrist Pain     right wrist MRI completed          Francois Santoro is a 25y.o. year old  who presents for a follow up of her right wrist.  She reports that she is doing quite well before she reinjured her wrist.  She reports that her symptoms are almost fully resolved status post de Quervain's release. She was helping her boyfriend fix her car. She reports she hyperextended her wrist when the wrench slipped and she hyperextended it. She had immediate pain and swelling. She is using Tylenol and a wrist brace with persistent pain over her wrist.  She reports she is having difficulty sleeping at night. No other injuries reported    Patient is 2 months status post injury. She presents for an MRI follow-up at today's visit. She still reports pain over the radial aspect of her wrist.  Pain is worsened when she comes out of the brace. This radial sided pain. No new injuries reported. She reports the pain is no longer radiating into the shoulder. This is radiating to her elbow now.       Past Medical History:   Diagnosis Date    Anxiety     Asthma     B[address] disorder [address] [address] [facility]     Depression     Ganglion cyst     right    Migraine     Ovarian cyst 2013     Past Surgical History:   Procedure Laterality Date    APPENDECTOMY  05/17/2017    laparoscopic    HAND SURGERY Right 12/28/2021    RIGHT WRIST GANGLION CYST EXCISION performed by Mesfin Burr MD at [address] [place] [address] Right 12/28/2021    RIGHT DEQUERVAINS RELEASE performed by Mesfin Burr MD at [facility] OR       Current Outpatient Medications:     Levonorgest-Eth Estrad 91-Day (JAIMIESS PO), Take by mouth, Disp: , Rfl:     omeprazole (PRILOSEC) 20 MG delayed release capsule, Take 1 capsule by mouth Daily, Disp: 30 capsule, Rfl: 1    loratadine (CLARITIN) 10 MG tablet, Take 1 tablet by mouth daily, Disp: 90 tablet, Rfl: 0    albuterol sulfate HFA (VENTOLIN HFA) 108 (90 with Friends and Family: Not on file    Attends [employer] Services: Not on file    Active Member of Clubs or Organizations: Not on file    Attends Club or Organization Meetings: Not on file    Marital Status: Not on file   Intimate Partner Violence:     Fear of Current or Ex-Partner: Not on file    Emotionally Abused: Not on file    Physically Abused: Not on file    Sexually Abused: Not on file   Housing Stability:     Unable to Pay for Housing in the Last Year: Not on file    Number of [place] in the Last Year: Not on file    Unstable Housing in the Last Year: Not on file     No family history on file. Skin: (-) rash,(-) psoriasis,(-) eczema, (-)skin cancer. Musculoskeletal: Continued radial sided wrist pain  Neurologic: (-) epilepsy, (-)seizures,(-) brain tumor,(-) TIA, (-)stroke, (-)headaches, (-)Parkinson disease,(-) memory loss, (-) LOC. Cardiovascular: (-) Chest pain, (-) swelling in legs/feet, (-) SOB, (-) cramping in legs/feet with walking. SUBJECTIVE:      Constitutional:    The patient is alert and oriented x 3, appears to be stated age and in no distress. Right upper extremity: Scar is maturing nicely over the radial aspect of the wrist.  She has tenderness over the scar. She remains point tender directly over the scaphoid snuffbox region which reproduces her pain. She is nontender over the SL and LT ligaments. Tenderness over the distal pole the scaphoid but negative Jones's maneuver. Nontender over the TFCC. DRUJ stable with full pronation supination. Wrist flexion extension intact mildly limited secondary pain discomfort. Radial, ulnar, median nerves are intact light touch. 2+ radial pulse. MRI of the right wrist was reviewed in coronal, sagittal, axial planes. This demonstrates a small joint effusion with ganglion cyst near the radius and scaphoid. No evidence of scaphoid fracture. Impression: No evidence of scaphoid fracture.   Small ganglion cyst over the radiocarpal joint. Radiographic findings reviewed with patient      Impression:   2 months s/p wrist sprain      Plan:     Discussed findings with patient. Recommended a referral to therapy. May wean out of brace and advance activities as tolerated. Provided a Medrol Dosepak along with a prescription for compound cream.  Patient to follow-up after therapy has been completed. All questions answered. I have seen and evaluated the patient and agree with the above assessment and plan on today's visit. I have performed the key components of the history and physical examination with significant findings of 2 months out right wrist pain. She is status post de Quervain's release. She reports initially excellent results until she reinjured it. MRI was negative for any acute fractures or dislocations involving the distal radius or scaphoid. She did have an incidental finding of a volar radial ganglion cyst.  She has no pain or symptoms in regards to the cyst.  Given these findings I have recommended a course of therapy. She is transition to a cock-up wrist brace which she will be weaned out of. She will follow-up after completing a course of therapy. . I concur with the findings and plan as documented.     Vincent Guerra MD  6/30/2022

## 2022-08-04 ENCOUNTER — EVALUATION (OUTPATIENT)
Dept: OCCUPATIONAL THERAPY | Age: 22
End: 2022-08-04
Payer: MEDICAID

## 2022-08-04 DIAGNOSIS — M25.531 RIGHT WRIST PAIN: Primary | ICD-10-CM

## 2022-08-04 PROCEDURE — 97530 THERAPEUTIC ACTIVITIES: CPT | Performed by: OCCUPATIONAL THERAPIST

## 2022-08-04 PROCEDURE — 97165 OT EVAL LOW COMPLEX 30 MIN: CPT | Performed by: OCCUPATIONAL THERAPIST

## 2022-08-04 NOTE — PROGRESS NOTES
OCCUPATIONAL THERAPY INITIAL EVALUATION    [address] THERAPY   [place] [id number] [address] [address]  Dept: [phone]  Loc: [address] OT Fax: [phone]    Date:  2022  Initial Evaluation Date: 22   Evaluating Therapist: Sheldon Jenkins OT    Patient Name:  Viviana Melvin    :  2000    Restrictions/Precautions:  none, low fall risk  Diagnosis:  M25.531 (ICD-10-CM) - Right wrist pain       Date of Surgery/Injury: 3/15/33    Insurance/Certification information:  [facility]   Plan of care signed (Y/N): N  Visit# / total visits:     Referring Practitioner:  Sukhwinder Ty MD  Specific Practitioner Orders: Right wrist rom and strengthening as tolerated, modalities prn, wean from brace  No restrictions    Assessment of current deficits   [] Functional mobility  [] ADLs  [x] Strength   [] Cognition   [] Functional transfers   [x] IADLs  [] Safety Awareness  [x] Endurance   [] Fine Motor Coordination  [] Balance  [] Vision/perception  [x] Sensation    [] Gross Motor Coordination [x] ROM  [x] Pain   [] Edema    [] Scar Adhesion/Skin Integrity     OT PLAN OF CARE   OT POC based on physician orders, patient diagnosis and results of clinical assessment    Frequency/Duration 1-2x a week for 12 visits  Specific OT Treatment to include:     [x] Instruction in HEP                   Modalities:  [x] Therapeutic Exercise        [x] Ultrasound               [] Electrical Stimulation/Attended  [x] PROM/Stretching                    [x] Fluidotherapy          [x]  Paraffin                   [x] AAROM  [x] AROM                 [] Iontophoresis:   [x] Tendon Glides                                               [] Neuromuscular Re-Ed            [] ADL/IADL re-training    [x] Therapeutic Activity       [x] Pain Management with/without modalities PRN                 [x] Manual Therapy                      [] Splinting                      [x] Scar Management                   []Joint Protection/Training  []Ergonomics                             [x] Joint Mobilization        [] Adaptive Equipment Assessment/Training                             [] Manual Edema Mobilization   [] Myofascial Release                 [] Energy Conservation/Work Simplification  [x] GM/FM Coordination       [] Safety retraining/education per  individual diagnosis/goals  [] Desensitization       Patient Specific Goal: to decrease pain at work                             GOALS (Long term same as Short term):  1) Patient will demonstrate good understanding of home program (exercises/activities/diagnosis/prognosis/goals) with good accuracy. 2) Patient will demonstrate increased active/passive range of motion of their R to [facility] for ADL/IADL completion of grooming tasks, [place] braiding hair. 3) Patient will demonstrate increased /pinch strength of at least 10 / 5 pinch pounds of their R hand to increase IADL work related lifting activities. 4) Patient to report decreased pain in their affected R upper extremity from 8/10 to 3/10 or less with resistive functional use. 5) Increase in fine motor function as evidenced by decreased time to complete 9-hole peg test and/or MRMT test by at least 5-10 seconds. 6) Patient to report 100% compliance with their splint wear, care, and precautions if needed. 7) Patient will decrease QuickDASH score to 20% or less for increased participation in daily functional activities.      Past Medical History:   Past Medical History:   Diagnosis Date    Anxiety     Asthma     Bipolar 1 disorder ([facility] [address]     Depression     Ganglion cyst     right    Migraine     Ovarian cyst 2013     Past Surgical History:   Past Surgical History:   Procedure Laterality Date    APPENDECTOMY  05/17/2017    laparoscopic    HAND SURGERY Right 12/28/2021    RIGHT WRIST GANGLION CYST EXCISION performed by Dick Begum MD at [facility] OR    WISDOM TOOTH EXTRACTION      WRIST SURGERY Right 12/28/2021    RIGHT DEQUERVAINS RELEASE performed by Marcello Contreras MD at [facility]       Reason for Referral: Pt is a 25year old female who presents this date for initial occupational therapy evaluation. Pt underwent sx in December of 2021 for a DeQuervain's release, pt was progressing well with healing but then injured her hand again in May of 2022. She was seen by the referring physician she was placed in a wrist cock up brace and referred to therapy for ROM, strengthening to decrease pain to increase independence. Home Living: lives with family  Prior Level of Function: independent    Cognition:   Alert/Oriented x3     IADL STATUS:   Ind Mod I Min A Mod A Max A Dep Other   Homemaking Responsibility   x       Shopping Responsibility:   x       Mode of Transportation:  x        Leisure & Hobbies:  x        Work:   x         Comments: Pt works at an [employer] where she is required to lift and move boxes frequently, she states that it is a part time job. Work has been good about decreasing lifting frequency and intensity. Pt is not responsible for any chores at home, she helps as she can. ADL STATUS:   Ind Mod I Min A Mod A Max A Dep Other   Feeding:  x        Grooming:   x       Bathing:  x        UE Dressing:  x        LE Dressing:  x        Toileting:  x        Transfers:  x          Comments: Pt is Mod independent in all ADLs, is unable to Welsh braid her hair due to increase in pain and fatigue.      Pain Level: best after resting and ice 0/10, worst 8/10 after a work day    UE Assessment:  Right Upper Extremity ROM  FOREARM Pronation 80-90* Full       Supination 80-90* full        WRIST Flexion 0-80* 68       Extension 0-70* 60       Radial Deviation 0-20* 15       Ulnar Deviation 0-30* 28        Right Hand ROM  Composite Fist: full  Opposition from thumb to small finger    Comment: Hand Dominance is right    Sensation: numbness throughout the hand    Dynamometer (setting 2) 8/4/22     Left 65#     Right 48#     Pinch (lateral)      Left 14#     Right 10#     Pinch (tripod)      Left 11#     Right 8#        9 Hole Peg test 8/4/22    Left 20.52 sec    Right 20.67 sec       QuickDash 8/4/22     54.5%          Intervention: Pt issued and educated in all HEP instructions that are detailed below. Pt tolerated all exercises well and demonstrated and verbalized all instructions. Will increase as tolerated. HEP:    Tendon glides   Median Nerve glides   Light resistive putty-gross gripping, pinching    CMC comfort cool brace to be worn at work to decrease pain and increase thumb and wrist support with un-restricted motion     Eval Complexity: low  Profile and History- Chart reviewed  Assessment of Occupational Performance and Identification of Deficits- 6   Clinical Decision Making- no additional modifications required    Rehab Potential:                                 [x] Good  [] Fair  [] Poor        Suggested Professional Referral:       [x] No  [] Yes:  Barriers to Goal Achievement[de-identified]          [x] No  [] Yes:  Domestic Concerns:                           [x] No  [] Yes:       Patient. Education:  [x] Plans/Goals, Risks/Benefits discussed  [x] Home exercise program  Method of Education: [x] Verbal  [x] Demo  [x] Written  Comprehension of Education:  [x] Verbalizes understanding. [x] Demonstrates understanding. [] Needs Review. [] Demonstrates/verbalizes understanding of HEP/Ed previously given.         Patient understands diagnosis/prognosis and consents to treatment, plan and goals: [x] Yes    [] No     Goal Formulation: Patient  Time In: 9:00            Time Out: 9:30                      Timed Code Treatment Minutes: 15 minutes      CODE  Minutes  Units   96542 OT Eval Low 15 1   16636 OT Eval Medium     50120 OT Eval High     56350 Fluidotherapy     74076 Manual     51669 Therapeutic Ex     32360 Therapeutic Activity 15 1   83299 ADL/COMP [employer]

## 2022-08-08 RX ORDER — LORATADINE 10 MG/1
TABLET ORAL
Qty: 90 TABLET | Refills: 0 | Status: SHIPPED
Start: 2022-08-08 | End: 2022-11-04

## 2022-08-18 ENCOUNTER — OFFICE VISIT (OUTPATIENT)
Dept: FAMILY MEDICINE CLINIC | Age: 22
End: 2022-08-18
Payer: MEDICAID

## 2022-08-18 VITALS
WEIGHT: 254 LBS | BODY MASS INDEX: 45 KG/M2 | OXYGEN SATURATION: 99 % | DIASTOLIC BLOOD PRESSURE: 80 MMHG | RESPIRATION RATE: 16 BRPM | TEMPERATURE: 98.3 F | HEIGHT: 63 IN | HEART RATE: 87 BPM | SYSTOLIC BLOOD PRESSURE: 126 MMHG

## 2022-08-18 DIAGNOSIS — L03.90 CELLULITIS, UNSPECIFIED CELLULITIS SITE: Primary | ICD-10-CM

## 2022-08-18 PROCEDURE — 99214 OFFICE O/P EST MOD 30 MIN: CPT | Performed by: FAMILY MEDICINE

## 2022-08-18 PROCEDURE — G8417 CALC BMI ABV UP PARAM F/U: HCPCS | Performed by: FAMILY MEDICINE

## 2022-08-18 PROCEDURE — 1036F TOBACCO NON-USER: CPT | Performed by: FAMILY MEDICINE

## 2022-08-18 PROCEDURE — G8427 DOCREV CUR MEDS BY ELIG CLIN: HCPCS | Performed by: FAMILY MEDICINE

## 2022-08-18 RX ORDER — AMITRIPTYLINE HYDROCHLORIDE 25 MG/1
TABLET, FILM COATED ORAL
COMMUNITY
Start: 2022-07-21

## 2022-08-18 RX ORDER — SULFAMETHOXAZOLE AND TRIMETHOPRIM 800; 160 MG/1; MG/1
1 TABLET ORAL 2 TIMES DAILY
Qty: 20 TABLET | Refills: 0 | Status: SHIPPED | OUTPATIENT
Start: 2022-08-18 | End: 2022-08-28

## 2022-08-18 NOTE — PROGRESS NOTES
[facility])  Family Medicine Outpatient        SUBJECTIVE:  CC: had concerns including Cyst (Pt c/o two painful cyst under left armpit for the past week. Pt states one of them has drained once but cant tell if it filled back up with fluid.). HPI:  Mat Barrios is a female 25 y.o. presented to the clinic with concerns of 2 bumps under his left arm pit. She denies any trauma. She has been using warm compresses. She denies having these before. She further denies any f/c. Review of Systems   Constitutional:  Negative for appetite change, fatigue and fever. Respiratory:  Negative for cough, shortness of breath and wheezing. Cardiovascular:  Negative for chest pain and palpitations. Gastrointestinal:  Negative for abdominal pain, constipation, diarrhea, nausea and vomiting. Skin:  Positive for rash. Negative for pallor. Outpatient Medications Marked as Taking for the 22 encounter (Office Visit) with Lui Landon MD   Medication Sig Dispense Refill    amitriptyline (ELAVIL) 25 MG tablet TAKE ONE TABLET BY MOUTH AT BEDTIME      [] sulfamethoxazole-trimethoprim (BACTRIM DS;SEPTRA DS) 800-160 MG per tablet Take 1 tablet by mouth 2 times daily for 10 days 20 tablet 0    loratadine (CLARITIN) 10 MG tablet TAKE ONE TABLET BY MOUTH DAILY 90 tablet 0    [DISCONTINUED] omeprazole (PRILOSEC) 20 MG delayed release capsule Take 1 capsule by mouth Daily Wean as tolerated 30 capsule 1    Levonorgest-Eth Estrad 91-Day (JAIMIESS PO) Take by mouth      albuterol sulfate HFA (PROVENTIL;VENTOLIN;PROAIR) 108 (90 Base) MCG/ACT inhaler Inhale 2 puffs into the lungs every 6 hours as needed for Wheezing       busPIRone (BUSPAR) 10 MG tablet Take 10 mg by mouth 2 times daily      montelukast (SINGULAIR) 10 MG tablet nightly       EPINEPHrine (EPIPEN 2-KRYSTA) 0.3 MG/0.3ML SOAJ injection Inject 0.3 mLs into the skin once as needed for up to 1 dose.  Use as directed for allergic reaction 2 each 0       I have reviewed all pertinent PMHx, PSHx, FamHx, SocialHx, medications, and allergies and updated history as appropriate. OBJECTIVE    VS: /80   Pulse 87   Temp 98.3 °F (36.8 °C)   Resp 16   Ht 5' 3\" (1.6 m)   Wt 254 lb (115.2 kg)   SpO2 99%   BMI 44.99 kg/m²   Physical Exam  Constitutional:       General: She is not in acute distress. Appearance: She is well-developed. She is not diaphoretic. HENT:      Head: Normocephalic and atraumatic. Eyes:      Conjunctiva/sclera: Conjunctivae normal.      Pupils: Pupils are equal, round, and reactive to light. Cardiovascular:      Rate and Rhythm: Normal rate and regular rhythm. Pulmonary:      Effort: Pulmonary effort is normal.      Breath sounds: Normal breath sounds. Abdominal:      General: Bowel sounds are normal. There is no distension. Palpations: Abdomen is soft. Tenderness: There is no abdominal tenderness. Hernia: No hernia is present. Musculoskeletal:      Cervical back: Normal range of motion and neck supple. Skin:     General: Skin is warm and dry. Comments: 2 cyst with Induration, erythema, and scabbing. Per patient erythema extending. Neurological:      Mental Status: She is alert and oriented to person, place, and time. ASSESSMENT/PLAN:  1. Cellulitis, unspecified cellulitis site  - sulfamethoxazole-trimethoprim (BACTRIM DS;SEPTRA DS) 800-160 MG per tablet; Take 1 tablet by mouth 2 times daily for 10 days  Dispense: 20 tablet; Refill: 0    I have reviewed my findings and recommendations with Mendy Lebron MD  9/6/2022 10:11 PM  Return for established visit. .     Counseled regarding above diagnosis, including possible risks and complications, especially if left uncontrolled. Patient counseled on red flag symptoms and if they occur to go to the ED. Discussed medications risk/benefits and possible side effects and alternatives to treatment.  Patient and/or guardian verbalizes understanding, agrees, feels comfortable with and wishes to proceed with above treatment plan. Advised patient regarding importance of keeping up with recommended health maintenance and to schedule as soon as possible if overdue, as this is important in assessing for undiagnosed pathology, especially cancer, as well as protecting against potentially harmful/life threatening disease. Patient and/or guardian verbalizes understanding and agrees with above counseling, assessment and plan. All questions answered. Please note this report has been partially produced using speech recognition software  and may contain errors related to that system including grammar, punctuation and spelling as well as words and phrases that may seem inappropriate. If there are questions or concerns please feel free to contact me to clarify.

## 2022-08-19 ENCOUNTER — TREATMENT (OUTPATIENT)
Dept: OCCUPATIONAL THERAPY | Age: 22
End: 2022-08-19
Payer: MEDICAID

## 2022-08-19 DIAGNOSIS — M25.531 RIGHT WRIST PAIN: Primary | ICD-10-CM

## 2022-08-19 PROCEDURE — 97110 THERAPEUTIC EXERCISES: CPT | Performed by: OCCUPATIONAL THERAPIST

## 2022-08-19 PROCEDURE — 97530 THERAPEUTIC ACTIVITIES: CPT | Performed by: OCCUPATIONAL THERAPIST

## 2022-08-19 PROCEDURE — 97018 PARAFFIN BATH THERAPY: CPT | Performed by: OCCUPATIONAL THERAPIST

## 2022-08-19 PROCEDURE — 97140 MANUAL THERAPY 1/> REGIONS: CPT | Performed by: OCCUPATIONAL THERAPIST

## 2022-08-19 NOTE — PROGRESS NOTES
[facility]   [address] [facility] [address]  Dept: [address] [place] [address] [address]: [phone]   Heather Patiño OT Fax: [phone]     OCCUPATIONAL THERAPY PROGRESS NOTE    Date:  2022  Initial Evaluation Date: 22    Patient Name:  Dinh Jordan    :  2000    Restrictions/Precautions:  none, low fall risk  Diagnosis:  M25.531 (ICD-10-CM) - Right wrist pain                                                             Date of Surgery/Injury: 22 (injury- sprain)  Dec 2021 (DeQuervain surgery)     Insurance/Certification information:  [facility]   Plan of care signed (Y/N): Y ( thru 22)  Visit# / total visits: Eval + visits approved thru 11-3-22     Referring Practitioner:  Miguel Braun MD  Specific Practitioner Orders: Right wrist rom and strengthening as tolerated, modalities prn, wean from brace  No restrictions     Assessment of current deficits  [] Functional mobility             [] ADLs           [x] Strength                  [] Cognition  [] Functional transfers           [x] IADLs          [] Safety Awareness  [x] Endurance  [] Fine Motor Coordination    [] Balance      [] Vision/perception    [x] Sensation     [] Gross Motor Coordination [x] ROM           [x] Pain                        [] Edema          [] Scar Adhesion/Skin Integrity     OT PLAN OF CARE   OT POC based on physician orders, patient diagnosis and results of clinical assessment     Frequency/Duration 1-2x a week for 12 visits  Specific OT Treatment to include:      [x] Instruction in HEP                   Modalities:  [x] Therapeutic Exercise                 [x] Ultrasound               [] Electrical Stimulation/Attended  [x] PROM/Stretching                    [x] Fluidotherapy          [x]  Paraffin                   [x] AAROM  [x] AROM                 [] Iontophoresis:   [x] Tendon Glides                                               [] Neuromuscular Re-Ed            [] ADL/IADL re-training    [x] Therapeutic Activity                  [x] Pain Management with/without modalities PRN                 [x] Manual Therapy                      [] Splinting                                   [x] Scar Management                   []Joint Protection/Training  []Ergonomics                             [x] Joint Mobilization                      [] Adaptive Equipment Assessment/Training                             [] Manual Edema Mobilization  [] Myofascial Release                 [] Energy Conservation/Work Simplification  [x] GM/FM Coordination                [] Safety retraining/education per  individual diagnosis/goals  [] Desensitization        Patient Specific Goal: to decrease pain at work                             GOALS (Long term same as Short term):  1) Patient will demonstrate good understanding of home program (exercises/activities/diagnosis/prognosis/goals) with good accuracy. 2) Patient will demonstrate increased active/passive range of motion of their R to [facility] for ADL/IADL completion of grooming tasks, Nepali braiding hair. 3) Patient will demonstrate increased /pinch strength of at least 10 / 5 pinch pounds of their R hand to increase IADL work related lifting activities. 4) Patient to report decreased pain in their affected R upper extremity from 8/10 to 3/10 or less with resistive functional use. 5) Increase in fine motor function as evidenced by decreased time to complete 9-hole peg test and/or MRMT test by at least 5-10 seconds. 6) Patient to report 100% compliance with their splint wear, care, and precautions if needed. 7) Patient will decrease QuickDASH score to 20% or less for increased participation in daily functional activities. Pain Level: 4 on scale of 1-10, aching, throbbing, and with paresthesia R wrist/ hand (best at rest)    Subjective:  Pt reports she has been trying to push herself in use of the R wrist/ hand.       Objective:  Updated POC to be completed by 10 th visit. INTERVENTION: COMPLETED: SPECIFICS/COMMENTS:   Modality:     Paraffin x 10 min to soften soft tissues and decrease joint stiffness   US x wrist x 5 min 3.3 MHz, 50% x 5 min to decrease swelling/ tightness in the wrist   AROM:     Wrist/ forearm AROM X  x - flex/ ext/ deviation, forearm rotation (fatiguing)  - wrist  Jux-a-ciser as tolerated 5x   Hand  x - tendon glides/ 10x   AAROM:               PROM/Stretching:     Wrist PROM x - gentle PROM in all planes        Scar Mass/Edema Control:     Scar massage x    Soft tissue mob X  x - wrist and forearm  - Size B stockinette compression for the wrist to prevent post tx swelling and ease discomfort   Strengthening:               Other:                 Assessment/Comments: Pt is making Good progress toward stated plan of care. Tx completed with attention to ROM and pain mgmt. All exercise completed with increasing pain. Fatigue reported with most ROM exercises. Will monitor response to today's session and advance as tolerated. -Rehab Potential: Good  -Requires OT Follow Up: Yes    Time In:1405            Time Out: 1500             Visit #: Eval +1    Units approved through:  11-3-22    CODE         Mins          Units Total Used/Total Authorized  [id number] Manual 15 1 1/48   47844 Therapeutic Ex 20 1 1/48   45491 Therapeutic Activity 10 1 1/48   95907 Fluidotherapy   0/48   17534 Ultrasound 5 0 0/48   17543 Paraffin 10 1 1/12   Total            -Response to Treatment: Pt tolerated session with no increases in pain. Fatigue was noted. Pt says she is not wearing her wrist splint and has been KT taping instead during work. Will monitor. Goals: Goals for pt can be see on initial eval occurring on 8-4-22    Plan:   [x]  Continue Plan of care: Pt education continues at each visit to obtain maximum benefits from skilled OT intervention.   []  Alter Plan of care:   []  Discharge:      Jayy Bliss OT /LEXII  [id number]

## 2022-08-26 ENCOUNTER — TREATMENT (OUTPATIENT)
Dept: OCCUPATIONAL THERAPY | Age: 22
End: 2022-08-26
Payer: MEDICAID

## 2022-08-26 DIAGNOSIS — M25.531 RIGHT WRIST PAIN: Primary | ICD-10-CM

## 2022-08-26 PROCEDURE — 97018 PARAFFIN BATH THERAPY: CPT | Performed by: OCCUPATIONAL THERAPIST

## 2022-08-26 PROCEDURE — 97110 THERAPEUTIC EXERCISES: CPT | Performed by: OCCUPATIONAL THERAPIST

## 2022-08-26 PROCEDURE — 97530 THERAPEUTIC ACTIVITIES: CPT | Performed by: OCCUPATIONAL THERAPIST

## 2022-08-26 PROCEDURE — 97140 MANUAL THERAPY 1/> REGIONS: CPT | Performed by: OCCUPATIONAL THERAPIST

## 2022-08-26 NOTE — PROGRESS NOTES
[facility]   [address]  [facility] [address]  Dept: [address] [place] [address] [address]: [phone]   Amanda Suarezyisel OT Fax: [phone]     OCCUPATIONAL THERAPY PROGRESS NOTE    Date:  2022  Initial Evaluation Date: 22    Patient Name:  Sravanthi Fuller    :  2000    Restrictions/Precautions:  none, low fall risk  Diagnosis:  M25.531 (ICD-10-CM) - Right wrist pain                                                             Date of Surgery/Injury: 22 (injury- sprain)  Dec 2021 (DeQuervain surgery)     Insurance/Certification information:  [facility]   Plan of care signed (Y/N): Y ( thru 22)  Visit# / total visits: Eval +2 / 12 visits approved thru 11-3-22     Referring Practitioner:  Dinorah Atkinson MD  Specific Practitioner Orders: Right wrist rom and strengthening as tolerated, modalities prn, wean from brace  No restrictions     Assessment of current deficits  [] Functional mobility             [] ADLs           [x] Strength                  [] Cognition  [] Functional transfers           [x] IADLs          [] Safety Awareness  [x] Endurance  [] Fine Motor Coordination    [] Balance      [] Vision/perception    [x] Sensation     [] Gross Motor Coordination [x] ROM           [x] Pain                        [] Edema          [] Scar Adhesion/Skin Integrity     OT PLAN OF CARE   OT POC based on physician orders, patient diagnosis and results of clinical assessment     Frequency/Duration 1-2x a week for 12 visits  Specific OT Treatment to include:      [x] Instruction in HEP                   Modalities:  [x] Therapeutic Exercise                 [x] Ultrasound               [] Electrical Stimulation/Attended  [x] PROM/Stretching                    [x] Fluidotherapy          [x]  Paraffin                   [x] AAROM  [x] AROM                 [] Iontophoresis:   [x] Tendon Glides                                               [] Neuromuscular Re-Ed            [] ADL/IADL re-training    [x] Therapeutic Activity                  [x] Pain Management with/without modalities PRN                 [x] Manual Therapy                      [] Splinting                                   [x] Scar Management                   []Joint Protection/Training  []Ergonomics                             [x] Joint Mobilization                      [] Adaptive Equipment Assessment/Training                             [] Manual Edema Mobilization  [] Myofascial Release                 [] Energy Conservation/Work Simplification  [x] GM/FM Coordination                [] Safety retraining/education per  individual diagnosis/goals  [] Desensitization        Patient Specific Goal: to decrease pain at work                             GOALS (Long term same as Short term):  1) Patient will demonstrate good understanding of home program (exercises/activities/diagnosis/prognosis/goals) with good accuracy. 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 3) Patient will demonstrate increased /pinch strength of at least 10 / 5 pinch pounds of their R hand to increase IADL work related lifting activities. 4) Patient to report decreased pain in their affected R upper extremity from 8/10 to 3/10 or less with resistive functional use. 5) Increase in fine motor function as evidenced by decreased time to complete 9-hole peg test and/or MRMT test by at least 5-10 seconds. 6) Patient to report 100% compliance with their splint wear, care, and precautions if needed. 7) Patient will decrease QuickDASH score to 20% or less for increased participation in daily functional activities. Pain Level: 2 on scale of 1-10, aching, and with paresthesia R wrist/ hand (best at rest)    Subjective:  Pt reports she has not been using the KT tape as much and is doing better with light lifting. .      Objective: Updated POC to be completed by 10 th visit. INTERVENTION: COMPLETED: SPECIFICS/COMMENTS:   Modality:     Paraffin x 10 min to soften soft tissues and decrease joint stiffness   US x wrist  5 min 3.3 MHz, 50% x 5 min to decrease swelling/ tightness in the wrist   AROM:     Wrist/ forearm AROM X  X  x - flex/ ext/ deviation, forearm rotation (fatiguing)  - wrist  Jux-a-ciser as tolerated 5x (4/10 discomfort)  - radial and ulnar wrist circumduction with dowel and cone 10x CLW/CCLW- sinificant fatigue   Hand  X  x - tendon glides/ 10x  - in hand translation with bunchems   AAROM:               PROM/Stretching:     Wrist PROM x - gentle PROM in all planes        Scar Mass/Edema Control:     Scar massage x    Soft tissue mob X  x - wrist and forearm  - Size B stockinette compression for the wrist to prevent post tx swelling and ease discomfort   Strengthening:               Other:                 Assessment/Comments: Pt is making Good progress toward stated plan of care. Tx completed with attention to ROM and pain mgmt. All exercises completed with mild wrist/ forearm discomfort. Fatigue continues with most ROM exercises. Will continue ROM with transition into light strengthening.     -Rehab Potential: Good  -Requires OT Follow Up: Yes    Time In:1400            Time Out: 1455            Visit #: Eval +2    Units approved through:  11-3-22    CODE         Mins          Units Total Used/Total Authorized  [id number] Manual 15 1 2/48   98966 Therapeutic Ex 20 1 2/48   78169 Therapeutic Activity 10 1 2/48   26706 Fluidotherapy   0/48   94452 Ultrasound   0/48   45559 Paraffin 10 1 2/12   Total            -Response to Treatment: Pt tolerated session with no increases in pain. Fatigue was noted. Pt says she is not wearing her wrist splint and has been KT taping instead during work. Will monitor.      Goals: Goals for pt can be see on initial eval occurring on 8-4-22    Plan:   [x]  Continue Plan of care: Start light strengthening as tolerated. Pt education continues at each visit to obtain maximum benefits from skilled OT intervention.   []  Alter Plan of care:   []  Discharge:      Leigh Mackay OT /LEXII  [id number]

## 2022-09-02 ENCOUNTER — TREATMENT (OUTPATIENT)
Dept: OCCUPATIONAL THERAPY | Age: 22
End: 2022-09-02

## 2022-09-02 NOTE — PROGRESS NOTES
[facility]   [address]  [facility] [address]  Dept: [address] [place] [address] [address]: [phone]   [facility] OT Fax: [phone]     OCCUPATIONAL THERAPY PROGRESS NOTE    Date:  2022  Initial Evaluation Date: 22    Patient Name:  Mike Ghotra    :  2000    Restrictions/Precautions:  none, low fall risk  Diagnosis:  M25.531 (ICD-10-CM) - Right wrist pain                                                             Date of Surgery/Injury: 22 (injury- sprain)  Dec 2021 (DeQuervain surgery)     Insurance/Certification information:  [facility]   Plan of care signed (Y/N): Y ( thru 22)  Visit# / total visits: Eval +3  visits approved thru 11-3-22     Referring Practitioner:  Orlando Dickinson MD  Specific Practitioner Orders: Right wrist rom and strengthening as tolerated, modalities prn, wean from brace  No restrictions     Assessment of current deficits  [] Functional mobility             [] ADLs           [x] Strength                  [] Cognition  [] Functional transfers           [x] IADLs          [] Safety Awareness  [x] Endurance  [] Fine Motor Coordination    [] Balance      [] Vision/perception    [x] Sensation     [] Gross Motor Coordination [x] ROM           [x] Pain                        [] Edema          [] Scar Adhesion/Skin Integrity     OT PLAN OF CARE   OT POC based on physician orders, patient diagnosis and results of clinical assessment     Frequency/Duration 1-2x a week for 12 visits  Specific OT Treatment to include:      [x] Instruction in HEP                   Modalities:  [x] Therapeutic Exercise                 [x] Ultrasound               [] Electrical Stimulation/Attended  [x] PROM/Stretching                    [x] Fluidotherapy          [x]  Paraffin                   [x] AAROM  [x] AROM                 [] Iontophoresis:   [x] Tendon Glides                                               [] Neuromuscular Re-Ed            [] ADL/IADL re-training    [x] Therapeutic Activity                  [x] Pain Management with/without modalities PRN                 [x] Manual Therapy                      [] Splinting                                   [x] Scar Management                   []Joint Protection/Training  []Ergonomics                             [x] Joint Mobilization                      [] Adaptive Equipment Assessment/Training                             [] Manual Edema Mobilization  [] Myofascial Release                 [] Energy Conservation/Work Simplification  [x] GM/FM Coordination                [] Safety retraining/education per  individual diagnosis/goals  [] Desensitization        Patient Specific Goal: to decrease pain at work                             GOALS (Long term same as Short term):  1) Patient will demonstrate good understanding of home program (exercises/activities/diagnosis/prognosis/goals) with good accuracy. 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 3) Patient will demonstrate increased /pinch strength of at least 10 / 5 pinch pounds of their R hand to increase IADL work related lifting activities. 4) Patient to report decreased pain in their affected R upper extremity from 8/10 to 3/10 or less with resistive functional use. 5) Increase in fine motor function as evidenced by decreased time to complete 9-hole peg test and/or MRMT test by at least 5-10 seconds. 6) Patient to report 100% compliance with their splint wear, care, and precautions if needed. 7) Patient will decrease QuickDASH score to 20% or less for increased participation in daily functional activities.      Pain Level: 2 on scale of 1-10, aching, and with paresthesia R wrist/ hand (best at rest)    Subjective:  Pt states she can carry more things with her hand/ wrist.    Objective:  Updated POC to be completed by 10 th initial eval occurring on 8-4-22    Plan:   [x]  Continue Plan of care: Start light strengthening as tolerated. Pt education continues at each visit to obtain maximum benefits from skilled OT intervention.   []  Alter Plan of care:   []  Discharge:      Isaiah Richardson OT /L  [id number]

## 2022-09-06 RX ORDER — OMEPRAZOLE 20 MG/1
CAPSULE, DELAYED RELEASE ORAL
Qty: 30 CAPSULE | Refills: 0 | Status: SHIPPED
Start: 2022-09-06 | End: 2022-10-11

## 2022-09-06 ASSESSMENT — ENCOUNTER SYMPTOMS
ABDOMINAL PAIN: 0
COUGH: 0
NAUSEA: 0
VOMITING: 0
SHORTNESS OF BREATH: 0
WHEEZING: 0
DIARRHEA: 0
CONSTIPATION: 0

## 2022-09-16 ENCOUNTER — TREATMENT (OUTPATIENT)
Dept: OCCUPATIONAL THERAPY | Age: 22
End: 2022-09-16
Payer: MEDICAID

## 2022-09-16 DIAGNOSIS — M25.531 RIGHT WRIST PAIN: Primary | ICD-10-CM

## 2022-09-16 PROCEDURE — 97140 MANUAL THERAPY 1/> REGIONS: CPT | Performed by: OCCUPATIONAL THERAPIST

## 2022-09-16 PROCEDURE — 97110 THERAPEUTIC EXERCISES: CPT | Performed by: OCCUPATIONAL THERAPIST

## 2022-09-16 PROCEDURE — 97530 THERAPEUTIC ACTIVITIES: CPT | Performed by: OCCUPATIONAL THERAPIST

## 2022-09-16 PROCEDURE — 97018 PARAFFIN BATH THERAPY: CPT | Performed by: OCCUPATIONAL THERAPIST

## 2022-09-16 NOTE — PROGRESS NOTES
ADL/IADL re-training    [x] Therapeutic Activity                  [x] Pain Management with/without modalities PRN                 [x] Manual Therapy                      [] Splinting                                   [x] Scar Management                   []Joint Protection/Training  []Ergonomics                             [x] Joint Mobilization                      [] Adaptive Equipment Assessment/Training                             [] Manual Edema Mobilization  [] Myofascial Release                 [] Energy Conservation/Work Simplification  [x] GM/FM Coordination                [] Safety retraining/education per  individual diagnosis/goals  [] Desensitization        Patient Specific Goal: to decrease pain at work                             GOALS (Long term same as Short term):  1) Patient will demonstrate good understanding of home program (exercises/activities/diagnosis/prognosis/goals) with good accuracy. 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 3) Patient will demonstrate increased /pinch strength of at least 10 / 5 pinch pounds of their R hand to increase IADL work related lifting activities. 4) Patient to report decreased pain in their affected R upper extremity from 8/10 to 3/10 or less with resistive functional use. 5) Increase in fine motor function as evidenced by decreased time to complete 9-hole peg test and/or MRMT test by at least 5-10 seconds. 6) Patient to report 100% compliance with their splint wear, care, and precautions if needed. 7) Patient will decrease QuickDASH score to 20% or less for increased participation in daily functional activities. Pain Level: 2 on scale of 1-10, mild aching  Subjective:  Pt reports her arm is feeling better and she is trying to use it more. Objective:  Updated POC to be completed by 10 th visit.     INTERVENTION: COMPLETED: SPECIFICS/COMMENTS:   Modality:     Paraffin x 10 min to soften soft tissues and decrease joint stiffness   US x wrist  5 min 3.3 MHz, 50% x 5 min to decrease swelling/ tightness in the wrist   AROM:     Wrist/ forearm AROM X  X  x - flex/ ext/ deviation, forearm rotation   - wrist  Jux-a-ciser as tolerated 5x (getting easier)  - radial and ulnar wrist circumduction with dowel and cone 10x CLW/CCLW   Hand  X   - tendon glides/ 10x  - in hand translation with bunchems/ easy   AAROM:               PROM/Stretching:     Wrist PROM x - gentle PROM in all planes        Scar Mass/Edema Control:     Scar massage x    Soft tissue mob X  x - wrist and forearm  - Size B stockinette compression for the wrist to prevent post tx swelling and ease discomfort   Strengthening:     Hand/ wrist ex     X  X  X    x - putty ex- xsoft beige taffy pull 10x up/ 10x down  - x soft putty ex gripping 20x  - wrist PREs ^2# 1-15 each flex/ ext  and deviations  - red 3# digiflex composite 20x/ each digit 10x  - weighted dowel 1#  1-10 forearm rotation and circumduction  - [place] 10# ed bar twist in/ out 10x each, bending palms down/ palms up 10x each        Other:                 Assessment/Comments: Pt is making Good progress toward stated plan of care. Tolerance for touch to the wrist and wrist ROM is getting better. Strengthening  continued with only minor discomfort. Pt states she has been using her arm more at home and was even changing tires.  Will continue.     -Rehab Potential: Good  -Requires OT Follow Up: Yes    Time In:1405         Time Out: 1500         Visit #: Eval +4    Units approved through:  11-3-22    CODE         Mins          Units Total Used/Total Authorized  [id number] Manual 15 1 4/48   65887 Therapeutic Ex 20 1 4/48   92963 Therapeutic Activity 10 1 4/48   55958 Fluidotherapy   0/48   75176 Ultrasound   0/48   44206 Paraffin 10 1 4/12   Total            -Response to Treatment: Pt states she feels better and has stopped using braces and KT tape for her wrist. or.     Goals: Goals for pt can be see on initial eval occurring on 8-4-22    Plan:   [x]  Continue Plan of care: Continue strengthening as tolerated. Pt education continues at each visit to obtain maximum benefits from skilled OT intervention.   []  Alter Plan of care:   []  Discharge:      Joselin Sauceda OT /L  [id number]

## 2022-09-22 ENCOUNTER — APPOINTMENT (OUTPATIENT)
Dept: GENERAL RADIOLOGY | Age: 22
End: 2022-09-22
Payer: MEDICAID

## 2022-09-22 ENCOUNTER — TELEPHONE (OUTPATIENT)
Dept: FAMILY MEDICINE CLINIC | Age: 22
End: 2022-09-22

## 2022-09-22 ENCOUNTER — HOSPITAL ENCOUNTER (EMERGENCY)
Age: 22
Discharge: HOME | End: 2022-09-22
Attending: EMERGENCY MEDICINE
Payer: MEDICAID

## 2022-09-22 VITALS
SYSTOLIC BLOOD PRESSURE: 138 MMHG | TEMPERATURE: 98.7 F | DIASTOLIC BLOOD PRESSURE: 90 MMHG | OXYGEN SATURATION: 100 % | RESPIRATION RATE: 18 BRPM | HEART RATE: 118 BPM

## 2022-09-22 DIAGNOSIS — K59.00 CONSTIPATION, UNSPECIFIED CONSTIPATION TYPE: ICD-10-CM

## 2022-09-22 DIAGNOSIS — R19.7 DIARRHEA, UNSPECIFIED TYPE: Primary | ICD-10-CM

## 2022-09-22 LAB
ALBUMIN SERPL-MCNC: 4.2 G/DL (ref 3.5–5.2)
ALP BLD-CCNC: 103 U/L (ref 35–104)
ALT SERPL-CCNC: 44 U/L (ref 0–32)
ANION GAP SERPL CALCULATED.3IONS-SCNC: 12 MMOL/L (ref 7–16)
AST SERPL-CCNC: 25 U/L (ref 0–31)
BACTERIA: ABNORMAL /HPF
BASOPHILS ABSOLUTE: 0.03 E9/L (ref 0–0.2)
BASOPHILS RELATIVE PERCENT: 0.2 % (ref 0–2)
BILIRUB SERPL-MCNC: 0.3 MG/DL (ref 0–1.2)
BILIRUBIN URINE: NEGATIVE
BLOOD, URINE: ABNORMAL
BUN BLDV-MCNC: 13 MG/DL (ref 6–20)
CALCIUM SERPL-MCNC: 9.2 MG/DL (ref 8.6–10.2)
CHLORIDE BLD-SCNC: 104 MMOL/L (ref 98–107)
CLARITY: CLEAR
CO2: 22 MMOL/L (ref 22–29)
COLOR: YELLOW
CREAT SERPL-MCNC: 0.9 MG/DL (ref 0.5–1)
EOSINOPHILS ABSOLUTE: 0.1 E9/L (ref 0.05–0.5)
EOSINOPHILS RELATIVE PERCENT: 0.7 % (ref 0–6)
EPITHELIAL CELLS, UA: ABNORMAL /HPF
GFR AFRICAN AMERICAN: >60
GFR NON-AFRICAN AMERICAN: >60 ML/MIN/1.73
GLUCOSE BLD-MCNC: 84 MG/DL (ref 74–99)
GLUCOSE URINE: NEGATIVE MG/DL
HCG, URINE, POC: NEGATIVE
HCT VFR BLD CALC: 44.9 % (ref 34–48)
HEMOGLOBIN: 14.4 G/DL (ref 11.5–15.5)
IMMATURE GRANULOCYTES #: 0.05 E9/L
IMMATURE GRANULOCYTES %: 0.4 % (ref 0–5)
KETONES, URINE: NEGATIVE MG/DL
LEUKOCYTE ESTERASE, URINE: ABNORMAL
LYMPHOCYTES ABSOLUTE: 3.43 E9/L (ref 1.5–4)
LYMPHOCYTES RELATIVE PERCENT: 25.7 % (ref 20–42)
Lab: NORMAL
MAGNESIUM: 2 MG/DL (ref 1.6–2.6)
MCH RBC QN AUTO: 28 PG (ref 26–35)
MCHC RBC AUTO-ENTMCNC: 32.1 % (ref 32–34.5)
MCV RBC AUTO: 87.4 FL (ref 80–99.9)
MONOCYTES ABSOLUTE: 0.64 E9/L (ref 0.1–0.95)
MONOCYTES RELATIVE PERCENT: 4.8 % (ref 2–12)
NEGATIVE QC PASS/FAIL: NORMAL
NEUTROPHILS ABSOLUTE: 9.12 E9/L (ref 1.8–7.3)
NEUTROPHILS RELATIVE PERCENT: 68.2 % (ref 43–80)
NITRITE, URINE: NEGATIVE
PDW BLD-RTO: 13.3 FL (ref 11.5–15)
PH UA: 6 (ref 5–9)
PLATELET # BLD: 380 E9/L (ref 130–450)
PMV BLD AUTO: 10.2 FL (ref 7–12)
POSITIVE QC PASS/FAIL: NORMAL
POTASSIUM SERPL-SCNC: 3.7 MMOL/L (ref 3.5–5)
PROTEIN UA: NEGATIVE MG/DL
RBC # BLD: 5.14 E12/L (ref 3.5–5.5)
RBC UA: ABNORMAL /HPF (ref 0–2)
SODIUM BLD-SCNC: 138 MMOL/L (ref 132–146)
SPECIFIC GRAVITY UA: 1.02 (ref 1–1.03)
TOTAL PROTEIN: 7.5 G/DL (ref 6.4–8.3)
UROBILINOGEN, URINE: 0.2 E.U./DL
WBC # BLD: 13.4 E9/L (ref 4.5–11.5)
WBC UA: ABNORMAL /HPF (ref 0–5)

## 2022-09-22 PROCEDURE — 99284 EMERGENCY DEPT VISIT MOD MDM: CPT

## 2022-09-22 PROCEDURE — 74022 RADEX COMPL AQT ABD SERIES: CPT

## 2022-09-22 PROCEDURE — 83735 ASSAY OF MAGNESIUM: CPT

## 2022-09-22 PROCEDURE — 96361 HYDRATE IV INFUSION ADD-ON: CPT

## 2022-09-22 PROCEDURE — 85025 COMPLETE CBC W/AUTO DIFF WBC: CPT

## 2022-09-22 PROCEDURE — 6360000002 HC RX W HCPCS: Performed by: EMERGENCY MEDICINE

## 2022-09-22 PROCEDURE — 2580000003 HC RX 258: Performed by: EMERGENCY MEDICINE

## 2022-09-22 PROCEDURE — 96374 THER/PROPH/DIAG INJ IV PUSH: CPT

## 2022-09-22 PROCEDURE — 80053 COMPREHEN METABOLIC PANEL: CPT

## 2022-09-22 PROCEDURE — 81001 URINALYSIS AUTO W/SCOPE: CPT

## 2022-09-22 RX ORDER — 0.9 % SODIUM CHLORIDE 0.9 %
1000 INTRAVENOUS SOLUTION INTRAVENOUS ONCE
Status: COMPLETED | OUTPATIENT
Start: 2022-09-22 | End: 2022-09-22

## 2022-09-22 RX ORDER — KETOROLAC TROMETHAMINE 30 MG/ML
30 INJECTION, SOLUTION INTRAMUSCULAR; INTRAVENOUS ONCE
Status: COMPLETED | OUTPATIENT
Start: 2022-09-22 | End: 2022-09-22

## 2022-09-22 RX ADMIN — SODIUM CHLORIDE 1000 ML: 9 INJECTION, SOLUTION INTRAVENOUS at 19:18

## 2022-09-22 RX ADMIN — KETOROLAC TROMETHAMINE 30 MG: 30 INJECTION, SOLUTION INTRAMUSCULAR; INTRAVENOUS at 19:19

## 2022-09-22 ASSESSMENT — ENCOUNTER SYMPTOMS
WHEEZING: 0
COUGH: 0
BACK PAIN: 0
SHORTNESS OF BREATH: 0
BLOOD IN STOOL: 0
CHEST TIGHTNESS: 0
VOMITING: 0
ABDOMINAL PAIN: 1
SINUS PRESSURE: 0
SORE THROAT: 0
DIARRHEA: 1
ABDOMINAL DISTENTION: 0
NAUSEA: 0

## 2022-09-22 NOTE — ED PROVIDER NOTES
Chief complaint:  Diarrhea    HPI history provided by the patient  Patient was in complaining of a couple of days of diarrhea with some abdominal cramping. Apparently had some nasal congestion and runny nose the other day. No recent antibiotic use. No back or flank pain. No hematuria dysuria. No fevers, sweats or chills. No vomiting. No abdominal distention. Has had a appendectomy in the past.  Patient states she took some Pepto-Bismol yesterday and now her stools are dark as well. No grossly bloody stool. Review of Systems   Constitutional:  Negative for chills, diaphoresis, fatigue and fever. HENT:  Negative for congestion, sinus pressure and sore throat. Respiratory:  Negative for cough, chest tightness, shortness of breath and wheezing. Cardiovascular:  Negative for chest pain, palpitations and leg swelling. Gastrointestinal:  Positive for abdominal pain and diarrhea. Negative for abdominal distention, blood in stool, nausea and vomiting. Genitourinary:  Negative for dysuria, flank pain, frequency and urgency. Musculoskeletal:  Negative for arthralgias, back pain, gait problem, joint swelling, myalgias, neck pain and neck stiffness. Skin:  Negative for rash and wound. Neurological:  Negative for dizziness, seizures, syncope, weakness, light-headedness, numbness and headaches. All other systems reviewed and are negative. Physical Exam  Vitals and nursing note reviewed. Constitutional:       General: She is not in acute distress. Appearance: She is well-developed. She is not ill-appearing, toxic-appearing or diaphoretic. HENT:      Head: Normocephalic and atraumatic. Eyes:      General: No scleral icterus. Extraocular Movements: Extraocular movements intact. Conjunctiva/sclera: Conjunctivae normal.      Pupils: Pupils are equal, round, and reactive to light. Cardiovascular:      Rate and Rhythm: Regular rhythm. Tachycardia present.       Heart sounds: Normal heart sounds. No murmur heard. Pulmonary:      Effort: Pulmonary effort is normal. No respiratory distress. Breath sounds: Normal breath sounds. No stridor, decreased air movement or transmitted upper airway sounds. No decreased breath sounds, wheezing, rhonchi or rales. Chest:      Chest wall: No tenderness. Abdominal:      General: Bowel sounds are normal. There is no distension. Palpations: Abdomen is soft. Tenderness: There is abdominal tenderness in the periumbilical area and suprapubic area. There is no right CVA tenderness, left CVA tenderness, guarding or rebound. Comments: Soft with mild mid abdominal and suprapubic tenderness on palpation with no guarding, rebound tenderness or rigidity. Musculoskeletal:         General: No swelling, tenderness, deformity or signs of injury. Cervical back: Full passive range of motion without pain, normal range of motion and neck supple. No spinous process tenderness or muscular tenderness. Right lower leg: No edema. Left lower leg: No edema. Comments: Arms and legs are neurovascular intact. No pretibial edema or calf pain. Skin:     General: Skin is warm and dry. Coloration: Skin is not cyanotic, jaundiced, mottled or pale. Findings: No bruising, erythema, petechiae or rash. Rash is not purpuric. Neurological:      General: No focal deficit present. Mental Status: She is alert and oriented to person, place, and time. GCS: GCS eye subscore is 4. GCS verbal subscore is 5. GCS motor subscore is 6. Cranial Nerves: Cranial nerves are intact. No cranial nerve deficit. Sensory: Sensation is intact. Motor: Motor function is intact. Coordination: Coordination is intact.  Coordination normal.        Procedures     [facility]                    --------------------------------------------- PAST HISTORY ---------------------------------------------  Past Medical History:  has a past medical history of Anxiety, Asthma, Bipolar 1 disorder ([facility] [address] Depression, Ganglion cyst, Migraine, and Ovarian cyst.    Past Surgical History:  has a past surgical history that includes Appendectomy (05/17/2017); [place] tooth extraction; Wrist surgery (Right, 12/28/2021); and Hand surgery (Right, 12/28/2021). Social History:  reports that she has never smoked. She has never used smokeless tobacco. She reports current alcohol use. She reports that she does not use drugs. Family History: family history is not on file. The patients home medications have been reviewed.     Allergies: Bee venom and Iodine    -------------------------------------------------- RESULTS -------------------------------------------------  Labs:  Results for orders placed or performed during the hospital encounter of 09/22/22   CBC with Auto Differential   Result Value Ref Range    WBC 13.4 (H) 4.5 - 11.5 E9/L    RBC 5.14 3.50 - 5.50 E12/L    Hemoglobin 14.4 11.5 - 15.5 g/dL    Hematocrit 44.9 34.0 - 48.0 %    MCV 87.4 80.0 - 99.9 fL    MCH 28.0 26.0 - 35.0 pg    MCHC 32.1 32.0 - 34.5 %    RDW 13.3 11.5 - 15.0 fL    Platelets 965 011 - 425 E9/L    MPV 10.2 7.0 - 12.0 fL    Neutrophils % 68.2 43.0 - 80.0 %    Immature Granulocytes % 0.4 0.0 - 5.0 %    Lymphocytes % 25.7 20.0 - 42.0 %    Monocytes % 4.8 2.0 - 12.0 %    Eosinophils % 0.7 0.0 - 6.0 %    Basophils % 0.2 0.0 - 2.0 %    Neutrophils Absolute 9.12 (H) 1.80 - 7.30 E9/L    Immature Granulocytes # 0.05 E9/L    Lymphocytes Absolute 3.43 1.50 - 4.00 E9/L    Monocytes Absolute 0.64 0.10 - 0.95 E9/L    Eosinophils Absolute 0.10 0.05 - 0.50 E9/L    Basophils Absolute 0.03 0.00 - 0.20 E9/L   Comprehensive Metabolic Panel   Result Value Ref Range    Sodium 138 132 - 146 mmol/L    Potassium 3.7 3.5 - 5.0 mmol/L    Chloride 104 98 - 107 mmol/L    CO2 22 22 - 29 mmol/L    Anion Gap 12 7 - 16 mmol/L    Glucose 84 74 - 99 mg/dL    BUN 13 6 - 20 mg/dL    Creatinine 0.9 0.5 - 1.0 mg/dL    GFR Non-African American >60 >=60 mL/min/1.73    GFR African American >60     Calcium 9.2 8.6 - 10.2 mg/dL    Total Protein 7.5 6.4 - 8.3 g/dL    Albumin 4.2 3.5 - 5.2 g/dL    Total Bilirubin 0.3 0.0 - 1.2 mg/dL    Alkaline Phosphatase 103 35 - 104 U/L    ALT 44 (H) 0 - 32 U/L    AST 25 0 - 31 U/L   Urinalysis   Result Value Ref Range    Color, UA Yellow Straw/Yellow    Clarity, UA Clear Clear    Glucose, Ur Negative Negative mg/dL    Bilirubin Urine Negative Negative    Ketones, Urine Negative Negative mg/dL    Specific Gravity, UA 1.025 1.005 - 1.030    Blood, Urine LARGE (A) Negative    pH, UA 6.0 5.0 - 9.0    Protein, UA Negative Negative mg/dL    Urobilinogen, Urine 0.2 <2.0 E.U./dL    Nitrite, Urine Negative Negative    Leukocyte Esterase, Urine SMALL (A) Negative   Magnesium   Result Value Ref Range    Magnesium 2.0 1.6 - 2.6 mg/dL   Microscopic Urinalysis   Result Value Ref Range    WBC, UA 2-5 0 - 5 /HPF    RBC, UA 0-1 0 - 2 /HPF    Epithelial Cells, UA MANY /HPF    Bacteria, UA MODERATE (A) None Seen /HPF   POC Pregnancy Urine Qual   Result Value Ref Range    HCG, Urine, POC Negative Negative    Lot Number [id number]     Positive QC Pass/Fail Pass     Negative QC Pass/Fail Pass        Radiology:  XR ACUTE ABD SERIES CHEST 1 VW   Final Result   No acute pulmonary or cardiac abnormalities. Retained stool involving the ascending colon. No evidence for bowel   obstruction or ileus. ------------------------- NURSING NOTES AND VITALS REVIEWED ---------------------------  Date / Time Roomed:  9/22/2022  5:41 PM  ED Bed Assignment:  24/24    The nursing notes within the ED encounter and vital signs as below have been reviewed.    BP (!) 138/90   Pulse (!) 118   Temp 98.7 °F (37.1 °C)   Resp 18   SpO2 100%   Oxygen Saturation Interpretation: Normal      ------------------------------------------ PROGRESS NOTES ------------------------------------------  I have spoken with the patient and discussed todays results, in addition to providing specific details for the plan of care and counseling regarding the diagnosis and prognosis. Their questions are answered at this time and they are agreeable with the plan. I discussed at length with them reasons for immediate return here for re evaluation. They will followup with primary care by calling their office tomorrow. --------------------------------- ADDITIONAL PROVIDER NOTES ---------------------------------  At this time the patient is without objective evidence of an acute process requiring hospitalization or inpatient management. They have remained hemodynamically stable throughout their entire ED visit and are stable for discharge with outpatient follow-up. The plan has been discussed in detail and they are aware of the specific conditions for emergent return, as well as the importance of follow-up. New Prescriptions    No medications on file       Diagnosis:  1. Diarrhea, unspecified type    2. Constipation, unspecified constipation type        Disposition:  Patient's disposition: Discharge to home  Patient's condition is stable.          Sushant Sanchez DO  09/22/22 2054

## 2022-09-22 NOTE — TELEPHONE ENCOUNTER
Call transferred to office from [facility] ([facility]) representative with red flag complaint of \"diarrhea\". This MA spoke with pt. Subjective: Pt started experiencing dark tarry diarrhea on Tuesday 9/20, multiple episodes. Pt reports that she took pepto which helped with the abdominal cramping but not with the diarrhea. Pt states \"I am peeing out of my butthole\". Pt states that she had the dark stool prior to starting Pepto. Current Symptoms: Diarrhea,      Onset: several days ago     Associated Symptoms: Abdominal pain/cramping, nausea, denies vomiting. Pain Severity: 4 on scale of 1-10 (extremely uncomfortable, worse then period cramps)     Temperature: Denies     What has been tried: Pepto OTC with no relief     LMP: Unknown, on continuous birth control Pregnant: no    Pt advised to present to the ED for evaluation asap. Pt verbalized she understood and is amendable.       Electronically signed by Rosa Laguerre MA on 9/22/22 at 2:12 PM EDT

## 2022-09-23 ENCOUNTER — TREATMENT (OUTPATIENT)
Dept: OCCUPATIONAL THERAPY | Age: 22
End: 2022-09-23
Payer: MEDICAID

## 2022-09-23 DIAGNOSIS — M25.531 RIGHT WRIST PAIN: Primary | ICD-10-CM

## 2022-09-23 PROCEDURE — 97018 PARAFFIN BATH THERAPY: CPT | Performed by: OCCUPATIONAL THERAPIST

## 2022-09-23 PROCEDURE — 97110 THERAPEUTIC EXERCISES: CPT | Performed by: OCCUPATIONAL THERAPIST

## 2022-09-23 PROCEDURE — 97530 THERAPEUTIC ACTIVITIES: CPT | Performed by: OCCUPATIONAL THERAPIST

## 2022-09-23 NOTE — PROGRESS NOTES
[facility]   [address]  [facility] [id number]  Dept: [address] [place] [address] [address]: [phone]   Rod Ledezma OT Fax: [phone]     OCCUPATIONAL THERAPY PROGRESS NOTE    Date:  2022  Initial Evaluation Date: 22    Patient Name:  Mat Barrios    :  2000    Restrictions/Precautions:  none, low fall risk  Diagnosis:  M25.531 (ICD-10-CM) - Right wrist pain                                                             Date of Surgery/Injury: 22 (injury- sprain)  Dec 2021 ([place] surgery)     Insurance/Certification information:  [facility]   Plan of care signed (Y/N): Y ( thru 22)  Visit# / total visits: Eval + visits approved thru 11-3-22     Referring Practitioner:  Gloria Byrne MD  Specific Practitioner Orders: Right wrist rom and strengthening as tolerated, modalities prn, wean from brace  No restrictions     Assessment of current deficits  [] Functional mobility             [] ADLs           [x] Strength                  [] Cognition  [] Functional transfers           [x] IADLs          [] Safety Awareness  [x] Endurance  [] Fine Motor Coordination    [] Balance      [] Vision/perception    [x] Sensation     [] Gross Motor Coordination [x] ROM           [x] Pain                        [] Edema          [] Scar Adhesion/Skin Integrity     OT PLAN OF CARE   OT POC based on physician orders, patient diagnosis and results of clinical assessment     Frequency/Duration 1-2x a week for 12 visits  Specific OT Treatment to include:      [x] Instruction in HEP                   Modalities:  [x] Therapeutic Exercise                 [x] Ultrasound               [] Electrical Stimulation/Attended  [x] PROM/Stretching                    [x] Fluidotherapy          [x]  Paraffin                   [x] AAROM  [x] AROM                 [] Iontophoresis:   [x] Tendon Glides                                               [] Neuromuscular Re-Ed            [] ADL/IADL re-training    [x] Therapeutic Activity                  [x] Pain Management with/without modalities PRN                 [x] Manual Therapy                      [] Splinting                                   [x] Scar Management                   []Joint Protection/Training  []Ergonomics                             [x] Joint Mobilization                      [] Adaptive Equipment Assessment/Training                             [] Manual Edema Mobilization  [] Myofascial Release                 [] Energy Conservation/Work Simplification  [x] GM/FM Coordination                [] Safety retraining/education per  individual diagnosis/goals  [] Desensitization        Patient Specific Goal: to decrease pain at work                             GOALS (Long term same as Short term):  1) Patient will demonstrate good understanding of home program (exercises/activities/diagnosis/prognosis/goals) with good accuracy. 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 3) Patient will demonstrate increased /pinch strength of at least 10 / 5 pinch pounds of their R hand to increase IADL work related lifting activities. 4) Patient to report decreased pain in their affected R upper extremity from 8/10 to 3/10 or less with resistive functional use. 5) Increase in fine motor function as evidenced by decreased time to complete 9-hole peg test and/or MRMT test by at least 5-10 seconds. 6) Patient to report 100% compliance with their splint wear, care, and precautions if needed. 7) Patient will decrease QuickDASH score to 20% or less for increased participation in daily functional activities. Pain Level: No pain reports today  Subjective:  Pt presents with no new complaints. She states she was in the ED yesterday with stomach problems.    Objective:  Updated POC to be completed by 10 th 5/48   54798 Therapeutic Activity 10 1 5/48   79983 Fluidotherapy      01658 Ultrasound      25659 Paraffin 10 1 5/12   Total            -Response to Treatment: Pt is happy with her progress. Goals: Goals for pt can be see on initial eval occurring on 8-4-22    Plan:   [x]  Continue Plan of care: Update status at next visit. .  Pt education continues at each visit to obtain maximum benefits from skilled OT intervention.   []  Alter Plan of care:   []  Discharge:      Latha Jamil OT /LEXII  [id number]

## 2022-10-06 ENCOUNTER — TREATMENT (OUTPATIENT)
Dept: OCCUPATIONAL THERAPY | Age: 22
End: 2022-10-06
Payer: MEDICAID

## 2022-10-06 DIAGNOSIS — M67.431 GANGLION OF RIGHT WRIST: ICD-10-CM

## 2022-10-06 DIAGNOSIS — M25.531 RIGHT WRIST PAIN: Primary | ICD-10-CM

## 2022-10-06 PROCEDURE — 97530 THERAPEUTIC ACTIVITIES: CPT | Performed by: OCCUPATIONAL THERAPY ASSISTANT

## 2022-10-06 PROCEDURE — 97018 PARAFFIN BATH THERAPY: CPT | Performed by: OCCUPATIONAL THERAPY ASSISTANT

## 2022-10-06 NOTE — PROGRESS NOTES
[facility]   Jaron Bradley [address] [place] [id number]  Dept: [address] [place] [address] [address]: [address] OT Fax: [phone]     OCCUPATIONAL THERAPY PROGRESS NOTE/ DISCHARGE SUMMARY    Date:  10/6/2022  Initial Evaluation Date: 22    Patient Name:  Katie Mauricio    :  2000    Restrictions/Precautions:  none, low fall risk  Diagnosis:  M25.531 (ICD-10-CM) - Right wrist pain                                                             Date of Surgery/Injury: 22 (injury- sprain)  Dec 2021 ([place] surgery)     Insurance/Certification information:  [facility]   Plan of care signed (Y/N): Y ( thru 22)  Visit# / total visits: Eval + visits approved thru 11-3-22     Referring Practitioner:  Arpan Mckoy MD  Specific Practitioner Orders: Right wrist rom and strengthening as tolerated, modalities prn, wean from brace  No restrictions     Assessment of current deficits  [] Functional mobility             [] ADLs           [x] Strength                  [] Cognition  [] Functional transfers           [x] IADLs          [] Safety Awareness  [x] Endurance  [] Fine Motor Coordination    [] Balance      [] Vision/perception    [x] Sensation     [] Gross Motor Coordination [x] ROM           [x] Pain                        [] Edema          [] Scar Adhesion/Skin Integrity     OT PLAN OF CARE   OT POC based on physician orders, patient diagnosis and results of clinical assessment     Frequency/Duration 1-2x a week for 12 visits  Specific OT Treatment to include:      [x] Instruction in HEP                   Modalities:  [x] Therapeutic Exercise                 [x] Ultrasound               [] Electrical Stimulation/Attended  [x] PROM/Stretching                    [x] Fluidotherapy          [x]  Paraffin                   [x] AAROM  [x] AROM                 [] Iontophoresis:   [x] Tendon Glides                                               [] Neuromuscular Re-Ed [] ADL/IADL re-training    [x] Therapeutic Activity                  [x] Pain Management with/without modalities PRN                 [x] Manual Therapy                      [] Splinting                                   [x] Scar Management                   []Joint Protection/Training  []Ergonomics                             [x] Joint Mobilization                      [] Adaptive Equipment Assessment/Training                             [] Manual Edema Mobilization  [] Myofascial Release                 [] Energy Conservation/Work Simplification  [x] GM/FM Coordination                [] Safety retraining/education per  individual diagnosis/goals  [] Desensitization        Patient Specific Goal: to decrease pain at work                             GOALS (Long term same as Short term): Updated on 10/6/22 in collaboration with Lorie KIM/LEXII  1) Patient will demonstrate good understanding of home program (exercises/activities/diagnosis/prognosis/goals) with good accuracy. Good progress towards goal. Pt. Completes 4 times a week. 2) Patient will demonstrate increased active/passive range of motion of their R to [facility] for ADL/IADL completion of grooming tasks, Faroese braiding hair. Goal Met. (See Measurements below) Pt. Able to Faroese braid her hair now. 3) Patient will demonstrate increased /pinch strength of at least 10 / 5 pinch pounds of their R hand to increase IADL work related lifting activities. Goal Met.  strength increased from 48# to 75# today. Lateral pinch increased from 10# to 15# and palmar 3 point pinch increased from 8# to 14# today. 4) Patient to report decreased pain in their affected R upper extremity from 8/10 to 3/10 or less with resistive functional use. Goal Met. Pt. Reports 2/10 pain with resistive use. 5) Increase in fine motor function as evidenced by decreased time to complete 9-hole peg test and/or MRMT test by at least 5-10 seconds.  Good progress towards goal. Nine hole peg test decreased from 20.67 secs to 17.77 secs  6) Patient to report 100% compliance with their splint wear, care, and precautions if needed. Goal Met. No longer wears splint. 7) Patient will decrease QuickDASH score to 20% or less for increased participation in daily functional activities. Goal Met. QuickDASH score decreased from 54.5% to 7% today. Pain Level: No pain reports today    Subjective:  Pt stated \"I started knitting a blanket for my mom on Saturday\"     Objective:  Updated POC to be completed by 10 th visit. INTERVENTION: COMPLETED: SPECIFICS/COMMENTS:   Modality:     Paraffin x 10 min to soften soft tissues and decrease joint stiffness   US x wrist  5 min 3.3 MHz, 50% x 5 min to decrease swelling/ tightness in the wrist   AROM:     Wrist/ forearm AROM X     - flex/ ext/ deviation, forearm rotation   - wrist  Jux-a-ciser as tolerated 5x   - radial and ulnar wrist circumduction with dowel and cone 10x CLW/CCLW   Hand     - tendon glides/ 10x  - in hand translation with bunchems/ easy   AAROM:               PROM/Stretching:     Wrist PROM x - gentle PROM in all planes        Scar Mass/Edema Control:     Scar massage x -Issued scar gel today. Soft tissue mob X  x - wrist and forearm  - Size B stockinette compression for the wrist to prevent post tx swelling and ease discomfort   Strengthening:     Hand/ wrist ex      - putty ex- xsoft beige taffy pull 10x up/ 10x down  - x soft putty ex gripping 20x  - wrist PREs ^2# 1-15 each flex/ ext  and deviations- 2/10 discomfort  - red 3# digiflex composite 20x/ each digit 10x  - weighted dowel 1#  1-10 forearm rotation and circumduction  - Wilma ^15# ed bar twist in/ out 10x each, bending palms down/ palms up 10x each  - velcro board- roll with handle 10x  - hand gripper / 3rd setting 2-10        Other:                 Assessment/Comments: Pt is making Good progress toward stated plan of care. Pt.  Tolerated all stretches and measurements today. Pt made good progress or met all goals today. Pt discharged today and will continue with HEP as needed. Collaborated with OT on current plan of care. UE Assessment:  Right Upper Extremity ROM                  EVAL      10/6/22  FOREARM Pronation 80-90* Full full          Supination 80-90* full full           WRIST Flexion 0-80* 68  85         Extension 0-70* 60  68         Radial Deviation 0-20* 15 30          Ulnar Deviation 0-30* 28 40             -Rehab Potential: Good  -Requires OT Follow Up: Yes    Time In:1500         Time Out: 7687      Visit #: Eval +6    Units approved through:  11-3-22    CODE         Mins          Units     Total used/ authorized  61046 Manual 5 0 4/48   51950 Therapeutic Ex   5/48   06215 Therapeutic Activity 20 1 6/48   15770 Fluidotherapy      71159 Ultrasound      12381 Paraffin 10 1 6/12   Total            -Response to Treatment: Pt is happy with her results today. Goals: Goals for pt can be see on initial eval occurring on 8-4-22    Plan:   []  Continue Plan of care:   Pt education continues at each visit to obtain maximum benefits from skilled OT intervention. []  Alter Plan of care:   [x]  Discharge: Continue with HEP as needed.       OLGA Law /LEXII  [id number]

## 2022-10-15 ENCOUNTER — HOSPITAL ENCOUNTER (EMERGENCY)
Age: 22
Discharge: HOME | End: 2022-10-15
Payer: MEDICAID

## 2022-10-15 ENCOUNTER — HOSPITAL ENCOUNTER (EMERGENCY)
Age: 22
Discharge: ANOTHER HOSPITAL | End: 2022-10-15
Payer: MEDICAID

## 2022-10-15 ENCOUNTER — APPOINTMENT (OUTPATIENT)
Dept: CT IMAGING | Age: 22
End: 2022-10-15
Payer: MEDICAID

## 2022-10-15 VITALS
TEMPERATURE: 98.4 F | RESPIRATION RATE: 18 BRPM | HEIGHT: 63 IN | WEIGHT: 250 LBS | HEART RATE: 115 BPM | DIASTOLIC BLOOD PRESSURE: 81 MMHG | SYSTOLIC BLOOD PRESSURE: 129 MMHG | OXYGEN SATURATION: 100 % | BODY MASS INDEX: 44.3 KG/M2

## 2022-10-15 VITALS
WEIGHT: 250 LBS | TEMPERATURE: 98.7 F | RESPIRATION RATE: 16 BRPM | DIASTOLIC BLOOD PRESSURE: 84 MMHG | BODY MASS INDEX: 44.3 KG/M2 | OXYGEN SATURATION: 100 % | SYSTOLIC BLOOD PRESSURE: 134 MMHG | HEART RATE: 150 BPM | HEIGHT: 63 IN

## 2022-10-15 DIAGNOSIS — J03.90 ACUTE TONSILLITIS, UNSPECIFIED ETIOLOGY: Primary | ICD-10-CM

## 2022-10-15 DIAGNOSIS — R00.0 TACHYCARDIA: ICD-10-CM

## 2022-10-15 DIAGNOSIS — J02.9 ACUTE PHARYNGITIS, UNSPECIFIED ETIOLOGY: Primary | ICD-10-CM

## 2022-10-15 DIAGNOSIS — J02.9 ACUTE PHARYNGITIS, UNSPECIFIED ETIOLOGY: ICD-10-CM

## 2022-10-15 LAB
ALBUMIN SERPL-MCNC: 4.2 G/DL (ref 3.5–5.2)
ALP BLD-CCNC: 143 U/L (ref 35–104)
ALT SERPL-CCNC: 16 U/L (ref 0–32)
ANION GAP SERPL CALCULATED.3IONS-SCNC: 14 MMOL/L (ref 7–16)
AST SERPL-CCNC: 18 U/L (ref 0–31)
BASOPHILS ABSOLUTE: 0.03 E9/L (ref 0–0.2)
BASOPHILS RELATIVE PERCENT: 0.2 % (ref 0–2)
BILIRUB SERPL-MCNC: 0.4 MG/DL (ref 0–1.2)
BUN BLDV-MCNC: 7 MG/DL (ref 6–20)
CALCIUM SERPL-MCNC: 9.4 MG/DL (ref 8.6–10.2)
CHLORIDE BLD-SCNC: 98 MMOL/L (ref 98–107)
CO2: 24 MMOL/L (ref 22–29)
CREAT SERPL-MCNC: 0.8 MG/DL (ref 0.5–1)
EOSINOPHILS ABSOLUTE: 0.01 E9/L (ref 0.05–0.5)
EOSINOPHILS RELATIVE PERCENT: 0.1 % (ref 0–6)
GFR AFRICAN AMERICAN: >60
GFR NON-AFRICAN AMERICAN: >60 ML/MIN/1.73
GLUCOSE BLD-MCNC: 90 MG/DL (ref 74–99)
HCT VFR BLD CALC: 43.1 % (ref 34–48)
HEMOGLOBIN: 13.8 G/DL (ref 11.5–15.5)
IMMATURE GRANULOCYTES #: 0.05 E9/L
IMMATURE GRANULOCYTES %: 0.3 % (ref 0–5)
LACTIC ACID: 1 MMOL/L (ref 0.5–2.2)
LYMPHOCYTES ABSOLUTE: 1.7 E9/L (ref 1.5–4)
LYMPHOCYTES RELATIVE PERCENT: 11.4 % (ref 20–42)
MCH RBC QN AUTO: 28 PG (ref 26–35)
MCHC RBC AUTO-ENTMCNC: 32 % (ref 32–34.5)
MCV RBC AUTO: 87.4 FL (ref 80–99.9)
MONO TEST: NEGATIVE
MONOCYTES ABSOLUTE: 0.74 E9/L (ref 0.1–0.95)
MONOCYTES RELATIVE PERCENT: 5 % (ref 2–12)
NEUTROPHILS ABSOLUTE: 12.38 E9/L (ref 1.8–7.3)
NEUTROPHILS RELATIVE PERCENT: 83 % (ref 43–80)
PDW BLD-RTO: 13.1 FL (ref 11.5–15)
PLATELET # BLD: 335 E9/L (ref 130–450)
PMV BLD AUTO: 10 FL (ref 7–12)
POTASSIUM REFLEX MAGNESIUM: 4.1 MMOL/L (ref 3.5–5)
RBC # BLD: 4.93 E12/L (ref 3.5–5.5)
SARS-COV-2, NAAT: NOT DETECTED
SODIUM BLD-SCNC: 136 MMOL/L (ref 132–146)
STREP GRP A PCR: NEGATIVE
STREP GRP A PCR: NEGATIVE
TOTAL PROTEIN: 8 G/DL (ref 6.4–8.3)
WBC # BLD: 14.9 E9/L (ref 4.5–11.5)

## 2022-10-15 PROCEDURE — 80053 COMPREHEN METABOLIC PANEL: CPT

## 2022-10-15 PROCEDURE — 70491 CT SOFT TISSUE NECK W/DYE: CPT

## 2022-10-15 PROCEDURE — 83605 ASSAY OF LACTIC ACID: CPT

## 2022-10-15 PROCEDURE — 6370000000 HC RX 637 (ALT 250 FOR IP): Performed by: PHYSICIAN ASSISTANT

## 2022-10-15 PROCEDURE — 87880 STREP A ASSAY W/OPTIC: CPT

## 2022-10-15 PROCEDURE — 6360000004 HC RX CONTRAST MEDICATION: Performed by: RADIOLOGY

## 2022-10-15 PROCEDURE — 6360000002 HC RX W HCPCS: Performed by: PHYSICIAN ASSISTANT

## 2022-10-15 PROCEDURE — 2580000003 HC RX 258: Performed by: PHYSICIAN ASSISTANT

## 2022-10-15 PROCEDURE — 85025 COMPLETE CBC W/AUTO DIFF WBC: CPT

## 2022-10-15 PROCEDURE — 86308 HETEROPHILE ANTIBODY SCREEN: CPT

## 2022-10-15 PROCEDURE — 87635 SARS-COV-2 COVID-19 AMP PRB: CPT

## 2022-10-15 PROCEDURE — 96374 THER/PROPH/DIAG INJ IV PUSH: CPT

## 2022-10-15 PROCEDURE — 96375 TX/PRO/DX INJ NEW DRUG ADDON: CPT

## 2022-10-15 PROCEDURE — 99211 OFF/OP EST MAY X REQ PHY/QHP: CPT

## 2022-10-15 RX ORDER — AMOXICILLIN AND CLAVULANATE POTASSIUM 875; 125 MG/1; MG/1
1 TABLET, FILM COATED ORAL 2 TIMES DAILY
Qty: 14 TABLET | Refills: 0 | Status: SHIPPED | OUTPATIENT
Start: 2022-10-15 | End: 2022-10-22

## 2022-10-15 RX ORDER — DEXAMETHASONE SODIUM PHOSPHATE 10 MG/ML
10 INJECTION INTRAMUSCULAR; INTRAVENOUS ONCE
Status: COMPLETED | OUTPATIENT
Start: 2022-10-15 | End: 2022-10-15

## 2022-10-15 RX ORDER — KETOROLAC TROMETHAMINE 30 MG/ML
30 INJECTION, SOLUTION INTRAMUSCULAR; INTRAVENOUS ONCE
Status: COMPLETED | OUTPATIENT
Start: 2022-10-15 | End: 2022-10-15

## 2022-10-15 RX ORDER — ACETAMINOPHEN 500 MG
1000 TABLET ORAL ONCE
Status: COMPLETED | OUTPATIENT
Start: 2022-10-15 | End: 2022-10-15

## 2022-10-15 RX ORDER — 0.9 % SODIUM CHLORIDE 0.9 %
1000 INTRAVENOUS SOLUTION INTRAVENOUS ONCE
Status: COMPLETED | OUTPATIENT
Start: 2022-10-15 | End: 2022-10-15

## 2022-10-15 RX ORDER — PREDNISONE 10 MG/1
TABLET ORAL
Qty: 20 TABLET | Refills: 0 | Status: SHIPPED | OUTPATIENT
Start: 2022-10-15 | End: 2022-10-25

## 2022-10-15 RX ORDER — AMOXICILLIN AND CLAVULANATE POTASSIUM 875; 125 MG/1; MG/1
1 TABLET, FILM COATED ORAL ONCE
Status: COMPLETED | OUTPATIENT
Start: 2022-10-15 | End: 2022-10-15

## 2022-10-15 RX ADMIN — ACETAMINOPHEN 1000 MG: 500 TABLET ORAL at 14:15

## 2022-10-15 RX ADMIN — SODIUM CHLORIDE 1000 ML: 9 INJECTION, SOLUTION INTRAVENOUS at 12:56

## 2022-10-15 RX ADMIN — DEXAMETHASONE SODIUM PHOSPHATE 10 MG: 10 INJECTION INTRAMUSCULAR; INTRAVENOUS at 11:41

## 2022-10-15 RX ADMIN — AMOXICILLIN AND CLAVULANATE POTASSIUM 1 TABLET: 875; 125 TABLET, FILM COATED ORAL at 14:15

## 2022-10-15 RX ADMIN — SODIUM CHLORIDE 1000 ML: 9 INJECTION, SOLUTION INTRAVENOUS at 11:40

## 2022-10-15 RX ADMIN — IOPAMIDOL 75 ML: 755 INJECTION, SOLUTION INTRAVENOUS at 13:31

## 2022-10-15 RX ADMIN — KETOROLAC TROMETHAMINE 30 MG: 30 INJECTION, SOLUTION INTRAMUSCULAR; INTRAVENOUS at 11:41

## 2022-10-15 ASSESSMENT — PAIN DESCRIPTION - DESCRIPTORS
DESCRIPTORS: SORE
DESCRIPTORS: SORE

## 2022-10-15 ASSESSMENT — PAIN DESCRIPTION - PAIN TYPE
TYPE: ACUTE PAIN
TYPE: ACUTE PAIN

## 2022-10-15 ASSESSMENT — PAIN SCALES - GENERAL
PAINLEVEL_OUTOF10: 8
PAINLEVEL_OUTOF10: 8

## 2022-10-15 ASSESSMENT — PAIN - FUNCTIONAL ASSESSMENT
PAIN_FUNCTIONAL_ASSESSMENT: PREVENTS OR INTERFERES SOME ACTIVE ACTIVITIES AND ADLS
PAIN_FUNCTIONAL_ASSESSMENT: 0-10
PAIN_FUNCTIONAL_ASSESSMENT: NONE - DENIES PAIN
PAIN_FUNCTIONAL_ASSESSMENT: PREVENTS OR INTERFERES SOME ACTIVE ACTIVITIES AND ADLS
PAIN_FUNCTIONAL_ASSESSMENT: 0-10

## 2022-10-15 ASSESSMENT — PAIN DESCRIPTION - LOCATION
LOCATION: THROAT
LOCATION: THROAT

## 2022-10-15 ASSESSMENT — PAIN DESCRIPTION - FREQUENCY
FREQUENCY: CONTINUOUS
FREQUENCY: CONTINUOUS

## 2022-10-15 NOTE — ED PROVIDER NOTES
[facility]                                                                                                                                    Department of Emergency Medicine   ED  Provider Note  Admit Date/RoomTime: 10/15/2022 11:12 AM  ED Room: Internal Waiting/IntWait        HPI:  10/15/22,   Time: 11:23 AM EDT         Julio Levi is a 25 y.o. female presenting to the ED for fever, sore throat and rapid HR, beginning 2 days ago. The complaint has been persistent, moderate in severity, and worsened by talking and swallowing. The patient arrives today complaining of a severe sore throat. She was seen at [facility] urgent care and sent here because of a rapid heart rate. Patient tells me her heart rate at [facility] was 148. She does have a fever on arrival of 100.7 with a heart rate of 123. The patient is complaining of a severe sore throat and swollen glands. She states that she feels weak and rundown. Denies chest pain, shortness of breath or cough. She states that her blood brother was sick recently but had different symptoms. Patient did not test herself for COVID-19 at home. She did have a rapid strep at the urgent care which was negative. The patient has been vaccinated for COVID-19. She is not aware of any known exposure to strep or COVID.         ROS:     Constitutional:  See HPI  HENT:  See HPI  Eyes: Negative for pain, discharge and redness  Respiratory: See HPI  Cardiovascular: Negative for CP, edema or palpitations  Gastrointestinal: Negative for nausea, vomiting, diarrhea and abdominal distention  Genitourinary: Negative for dysuria and frequency  Musculoskeletal: Negative for back pain and arthralgia  Skin: Negative for rash and wound  Neurological: Negative for weakness and headaches  Hematological: Negative for adenopathy    All other systems reviewed and are negative      -------------------------------- PAST HISTORY ----------------------------------  Past Medical History:  has a past medical history of Anxiety, Asthma, Bipolar 1 disorder ([facility] [address] Depression, Ganglion cyst, Migraine, and Ovarian cyst.    Past Surgical History:  has a past surgical history that includes Appendectomy (05/17/2017); [place] tooth extraction; Wrist surgery (Right, 12/28/2021); and Hand surgery (Right, 12/28/2021). Social History:  reports that she has never smoked. She has never used smokeless tobacco. She reports current alcohol use. She reports that she does not use drugs. Family History: family history is not on file. The patients home medications have been reviewed.     Allergies: Bee venom and Iodine    --------------------------------- RESULTS ------------------------------------------  All laboratory and radiology results have been personally reviewed by myself   LABS:  Results for orders placed or performed during the hospital encounter of 10/15/22   Strep Screen Group A Throat    Specimen: Throat   Result Value Ref Range    Strep Grp A PCR Negative Negative   COVID-19, Rapid    Specimen: Nasopharyngeal Swab   Result Value Ref Range    SARS-CoV-2, NAAT Not Detected Not Detected   CBC with Auto Differential   Result Value Ref Range    WBC 14.9 (H) 4.5 - 11.5 E9/L    RBC 4.93 3.50 - 5.50 E12/L    Hemoglobin 13.8 11.5 - 15.5 g/dL    Hematocrit 43.1 34.0 - 48.0 %    MCV 87.4 80.0 - 99.9 fL    MCH 28.0 26.0 - 35.0 pg    MCHC 32.0 32.0 - 34.5 %    RDW 13.1 11.5 - 15.0 fL    Platelets 067 078 - 474 E9/L    MPV 10.0 7.0 - 12.0 fL    Neutrophils % 83.0 (H) 43.0 - 80.0 %    Immature Granulocytes % 0.3 0.0 - 5.0 %    Lymphocytes % 11.4 (L) 20.0 - 42.0 %    Monocytes % 5.0 2.0 - 12.0 %    Eosinophils % 0.1 0.0 - 6.0 %    Basophils % 0.2 0.0 - 2.0 %    Neutrophils Absolute 12.38 (H) 1.80 - 7.30 E9/L    Immature Granulocytes # 0.05 E9/L    Lymphocytes Absolute 1.70 1.50 - 4.00 E9/L    Monocytes Absolute 0.74 0.10 - 0.95 E9/L    Eosinophils Absolute 0.01 (L) 0.05 - 0.50 E9/L    Basophils Absolute 0.03 0.00 - 0.20 E9/L Comprehensive Metabolic Panel w/ Reflex to MG   Result Value Ref Range    Sodium 136 132 - 146 mmol/L    Potassium reflex Magnesium 4.1 3.5 - 5.0 mmol/L    Chloride 98 98 - 107 mmol/L    CO2 24 22 - 29 mmol/L    Anion Gap 14 7 - 16 mmol/L    Glucose 90 74 - 99 mg/dL    BUN 7 6 - 20 mg/dL    Creatinine 0.8 0.5 - 1.0 mg/dL    GFR Non-African American >60 >=60 mL/min/1.73    GFR African American >60     Calcium 9.4 8.6 - 10.2 mg/dL    Total Protein 8.0 6.4 - 8.3 g/dL    Albumin 4.2 3.5 - 5.2 g/dL    Total Bilirubin 0.4 0.0 - 1.2 mg/dL    Alkaline Phosphatase 143 (H) 35 - 104 U/L    ALT 16 0 - 32 U/L    AST 18 0 - 31 U/L   Mononucleosis Screen   Result Value Ref Range    Mono Test Negative Negative   Lactic Acid   Result Value Ref Range    Lactic Acid 1.0 0.5 - 2.2 mmol/L       RADIOLOGY:  Interpreted by Radiologist.  CT SOFT TISSUE NECK W CONTRAST   Final Result   Prominence of the bilateral palatine tonsils with stripe-like enhancement,   likely related to acute tonsillitis. No fluid collection or peritonsillar   abscess. Prominent bilateral level 2A lymph nodes, likely reactive.             ----------------- NURSING NOTES AND VITALS REVIEWED ---------------   The nursing notes within the ED encounter and vital signs as below have been reviewed. /89   Pulse (!) 120   Temp (!) 100.7 °F (38.2 °C)   Resp 18   Ht 5' 3\" (1.6 m)   Wt 250 lb (113.4 kg)   SpO2 98%   BMI 44.29 kg/m²   Oxygen Saturation Interpretation: Normal      --------------------------------PHYSICAL EXAM------------------------------------      Constitutional/General: Alert and oriented x3, well appearing, non toxic in NAD  Head: NC/AT  Eyes: PERRL, EOMI  TM\"s intact and clear. Posterior pharynx with moderate erythema and tonsillar exudates.   Marked bilateral cervical lymphadenopathy,   Mouth: Oropharynx clear, handling secretions, no trismus  Neck: Supple, full ROM, no meningeal signs  Pulmonary: Lungs clear to auscultation bilaterally, no wheezes, rales, or rhonchi. Not in respiratory distress  Cardiovascular:  Regular rate and rhythm, no murmurs, gallops, or rubs. 2+ distal pulses  Abdomen: Soft, + BS. No distension. Nontender. No palpable rigidity, rebound or guarding  Extremities: Moves all extremities x 4. Warm and well perfused  Skin: warm and dry without rash  Neurologic: GCS 15,  Intact. No focal deficits  Psych: Normal Affect      ------------------------ ED COURSE/MEDICAL DECISION MAKING----------------------  Medications   amoxicillin-clavulanate (AUGMENTIN) 875-125 MG per tablet 1 tablet (has no administration in time range)   acetaminophen (TYLENOL) tablet 1,000 mg (has no administration in time range)   0.9 % sodium chloride bolus (0 mLs IntraVENous Stopped 10/15/22 1233)   ketorolac (TORADOL) injection 30 mg (30 mg IntraVENous Given 10/15/22 1141)   dexamethasone (DECADRON) injection 10 mg (10 mg IntraVENous Given 10/15/22 1141)   0.9 % sodium chloride bolus (0 mLs IntraVENous Stopped 10/15/22 1339)   iopamidol (ISOVUE-370) 76 % injection 75 mL (75 mLs IntraVENous Given 10/15/22 1331)         Medical Decision Making:    Strep, Covid and Mono all negative. Pt still tachy with leukocytosis. Plan CT neck for further evaluation. States she is allergic to Chloraprep but can tolerate IV contrast with studies. Has had this in past.       CT scan showed inflammatory changes in the tonsils and reactive lymphadenopathy in the but no discrete abscess. These results were discussed. She is going to be given Augmentin here and sent home with steroids and Augmentin. Follow-up with PCP advised. Return here to the ED if any worsening symptoms, fever or vomiting. Pt aware and agreeable to plan. Counseling: The emergency provider has spoken with the patient and discussed todays results, in addition to providing specific details for the plan of care and counseling regarding the diagnosis and prognosis. Questions are answered at this time and they are agreeable with the plan.      ------------------------ IMPRESSION AND DISPOSITION -------------------------------    IMPRESSION  1. Acute tonsillitis, unspecified etiology    2.  Acute pharyngitis, unspecified etiology        DISPOSITION  Disposition: Discharge to home  Patient condition is stable                   Antonio Evans PA-C  10/15/22 8007

## 2022-10-15 NOTE — ED PROVIDER NOTES
[address] [facility]  Department of Emergency Medicine  UC Encounter Note  10/15/22   10:01 AM EDT      NAME: Espinoza Dumont  :  2000  MRN:  [id number]    Chief Complaint: Pharyngitis      This is a 71-year-old female the presents to urgent care complaining of a sore throat since yesterday. Patient states having pain with swallowing. But she is denying any shortness of breath. She states otherwise she feels okay she denies any headache or lightheadedness or dizziness. No chest pain. No abdominal pain nausea vomiting diarrhea or urinary symptoms. She denies pregnancy at this time. On first contact with patient she appears to be in no acute distress. She denies taking cough and cold medications today. No caffeine today. Non-smoker. Review of Systems  Pertinent positives and negatives are stated within HPI, all other systems reviewed and are negative. Physical Exam  Vitals and nursing note reviewed. Constitutional:       Appearance: She is well-developed. HENT:      Head: Normocephalic and atraumatic. Right Ear: Hearing, tympanic membrane, ear canal and external ear normal.      Left Ear: Hearing, tympanic membrane, ear canal and external ear normal.      Nose: Nose normal. No congestion or rhinorrhea. Right Sinus: No maxillary sinus tenderness or frontal sinus tenderness. Left Sinus: No maxillary sinus tenderness or frontal sinus tenderness. Mouth/Throat:      Lips: Pink. Mouth: Mucous membranes are moist. No angioedema. Pharynx: Uvula midline. Pharyngeal swelling and posterior oropharyngeal erythema present. No oropharyngeal exudate or uvula swelling. Tonsils: No tonsillar exudate. Comments: Oropharynx is patent. Eyes:      General: Lids are normal.      Conjunctiva/sclera: Conjunctivae normal.      Pupils: Pupils are equal, round, and reactive to light.    Neck:      Comments: Tender anterior soft tissue     Cardiovascular: Rate and Rhythm: Regular rhythm. Tachycardia present. Heart sounds: Normal heart sounds. No murmur heard. Pulmonary:      Effort: Pulmonary effort is normal.      Breath sounds: Normal breath sounds. Abdominal:      General: Bowel sounds are normal.      Palpations: Abdomen is soft. Abdomen is not rigid. Tenderness: There is no abdominal tenderness. There is no guarding or rebound. Musculoskeletal:      Cervical back: Full passive range of motion without pain, normal range of motion and neck supple. No edema, erythema or rigidity. No spinous process tenderness or muscular tenderness. Skin:     General: Skin is warm and dry. Findings: No abrasion or rash. Neurological:      Mental Status: She is alert and oriented to person, place, and time. GCS: GCS eye subscore is 4. GCS verbal subscore is 5. GCS motor subscore is 6. Cranial Nerves: No cranial nerve deficit. Sensory: No sensory deficit. Coordination: Coordination normal.      Gait: Gait normal.   Psychiatric:         Mood and Affect: Mood is anxious. Procedures    MDM  Number of Diagnoses or Management Options  Acute pharyngitis, unspecified etiology  Tachycardia  Diagnosis management comments: No acute distress. Strep negative. Heart rate still at 148-150  ? Infectious process. Some anxiety. Recommend patient ED evaluation. Family to take her             --------------------------------------------- PAST HISTORY ---------------------------------------------  Past Medical History:  has a past medical history of Anxiety, Asthma, Bipolar 1 disorder ([facility] [address] Depression, Ganglion cyst, Migraine, and Ovarian cyst.    Past Surgical History:  has a past surgical history that includes Appendectomy (05/17/2017); [place] tooth extraction; Wrist surgery (Right, 12/28/2021); and Hand surgery (Right, 12/28/2021). Social History:  reports that she has never smoked.  She has never used smokeless tobacco. She reports current alcohol use. She reports that she does not use drugs. Family History: family history is not on file. The patients home medications have been reviewed. Allergies: Bee venom and Iodine    -------------------------------------------------- RESULTS -------------------------------------------------  Results for orders placed or performed during the hospital encounter of 10/15/22   Strep Screen Group A Throat    Specimen: Throat   Result Value Ref Range    Strep Grp A PCR Negative Negative     No orders to display       ------------------------- NURSING NOTES AND VITALS REVIEWED ---------------------------   The nursing notes within the ED encounter and vital signs as below have been reviewed. /84   Pulse (!) 148 Comment: Checked with 2 different Pulse ox's. Both read & stay the same . Author Lexie Reported to the Provider  Temp 98.7 °F (37.1 °C) (Temporal)   Resp 16   Ht 5' 3\" (1.6 m)   Wt 250 lb (113.4 kg)   SpO2 100%   BMI 44.29 kg/m²   Oxygen Saturation Interpretation: Normal      ------------------------------------------ PROGRESS NOTES ------------------------------------------   I have spoken with the patient and discussed todays results, in addition to providing specific details for the plan of care and counseling regarding the diagnosis and prognosis. Their questions are answered at this time and they are agreeable with the plan.      --------------------------------- ADDITIONAL PROVIDER NOTES ---------------------------------     This patient is stable for discharge. I have shared the specific conditions for return, as well as the importance of follow-up. * NOTE: This report was transcribed using voice recognition software. Every effort was made to ensure accuracy; however, inadvertent computerized transcription errors may be present.    --------------------------------- IMPRESSION AND DISPOSITION ---------------------------------    IMPRESSION  1.  Acute pharyngitis, unspecified etiology 2. Tachycardia        DISPOSITION  Disposition: Discharge to ED for further evaluation by car  Patient condition is stable.         Ankit Zaragoza PA-C  10/15/22 1039

## 2022-11-04 RX ORDER — LORATADINE 10 MG/1
TABLET ORAL
Qty: 90 TABLET | Refills: 0 | Status: SHIPPED | OUTPATIENT
Start: 2022-11-04

## 2022-11-10 ENCOUNTER — OFFICE VISIT (OUTPATIENT)
Dept: FAMILY MEDICINE CLINIC | Age: 22
End: 2022-11-10
Payer: MEDICAID

## 2022-11-10 VITALS
RESPIRATION RATE: 19 BRPM | WEIGHT: 257 LBS | DIASTOLIC BLOOD PRESSURE: 88 MMHG | HEIGHT: 63 IN | HEART RATE: 123 BPM | OXYGEN SATURATION: 99 % | TEMPERATURE: 98.6 F | SYSTOLIC BLOOD PRESSURE: 128 MMHG | BODY MASS INDEX: 45.54 KG/M2

## 2022-11-10 DIAGNOSIS — R00.0 TACHYCARDIA: ICD-10-CM

## 2022-11-10 DIAGNOSIS — R00.0 TACHYCARDIA: Primary | ICD-10-CM

## 2022-11-10 DIAGNOSIS — I10 HYPERTENSION, UNSPECIFIED TYPE: ICD-10-CM

## 2022-11-10 DIAGNOSIS — J02.9 ACUTE VIRAL PHARYNGITIS: ICD-10-CM

## 2022-11-10 DIAGNOSIS — A08.4 VIRAL GASTROENTERITIS: ICD-10-CM

## 2022-11-10 LAB
ALBUMIN SERPL-MCNC: 4.1 G/DL (ref 3.5–5.2)
ALP BLD-CCNC: 93 U/L (ref 35–104)
ALT SERPL-CCNC: 20 U/L (ref 0–32)
ANION GAP SERPL CALCULATED.3IONS-SCNC: 12 MMOL/L (ref 7–16)
AST SERPL-CCNC: 26 U/L (ref 0–31)
BASOPHILS ABSOLUTE: 0.04 E9/L (ref 0–0.2)
BASOPHILS RELATIVE PERCENT: 0.4 % (ref 0–2)
BILIRUB SERPL-MCNC: 0.3 MG/DL (ref 0–1.2)
BUN BLDV-MCNC: 10 MG/DL (ref 6–20)
CALCIUM SERPL-MCNC: 9.6 MG/DL (ref 8.6–10.2)
CHLORIDE BLD-SCNC: 103 MMOL/L (ref 98–107)
CO2: 21 MMOL/L (ref 22–29)
CREAT SERPL-MCNC: 0.7 MG/DL (ref 0.5–1)
EOSINOPHILS ABSOLUTE: 0.07 E9/L (ref 0.05–0.5)
EOSINOPHILS RELATIVE PERCENT: 0.7 % (ref 0–6)
GFR SERPL CREATININE-BSD FRML MDRD: >60 ML/MIN/1.73
GLUCOSE BLD-MCNC: 93 MG/DL (ref 74–99)
HCT VFR BLD CALC: 39.8 % (ref 34–48)
HEMOGLOBIN: 12.9 G/DL (ref 11.5–15.5)
IMMATURE GRANULOCYTES #: 0.02 E9/L
IMMATURE GRANULOCYTES %: 0.2 % (ref 0–5)
LYMPHOCYTES ABSOLUTE: 2.79 E9/L (ref 1.5–4)
LYMPHOCYTES RELATIVE PERCENT: 29.3 % (ref 20–42)
MCH RBC QN AUTO: 28.8 PG (ref 26–35)
MCHC RBC AUTO-ENTMCNC: 32.4 % (ref 32–34.5)
MCV RBC AUTO: 88.8 FL (ref 80–99.9)
MONOCYTES ABSOLUTE: 0.46 E9/L (ref 0.1–0.95)
MONOCYTES RELATIVE PERCENT: 4.8 % (ref 2–12)
NEUTROPHILS ABSOLUTE: 6.13 E9/L (ref 1.8–7.3)
NEUTROPHILS RELATIVE PERCENT: 64.6 % (ref 43–80)
PDW BLD-RTO: 13.9 FL (ref 11.5–15)
PLATELET # BLD: 373 E9/L (ref 130–450)
PMV BLD AUTO: 10.2 FL (ref 7–12)
POTASSIUM SERPL-SCNC: 4.5 MMOL/L (ref 3.5–5)
RBC # BLD: 4.48 E12/L (ref 3.5–5.5)
SODIUM BLD-SCNC: 136 MMOL/L (ref 132–146)
T4 FREE: 1.29 NG/DL (ref 0.93–1.7)
TOTAL PROTEIN: 7.4 G/DL (ref 6.4–8.3)
TSH SERPL DL<=0.05 MIU/L-ACNC: 1.72 UIU/ML (ref 0.27–4.2)
WBC # BLD: 9.5 E9/L (ref 4.5–11.5)

## 2022-11-10 PROCEDURE — G8427 DOCREV CUR MEDS BY ELIG CLIN: HCPCS | Performed by: FAMILY MEDICINE

## 2022-11-10 PROCEDURE — 1036F TOBACCO NON-USER: CPT | Performed by: FAMILY MEDICINE

## 2022-11-10 PROCEDURE — 99214 OFFICE O/P EST MOD 30 MIN: CPT | Performed by: FAMILY MEDICINE

## 2022-11-10 PROCEDURE — 93000 ELECTROCARDIOGRAM COMPLETE: CPT | Performed by: FAMILY MEDICINE

## 2022-11-10 PROCEDURE — G8417 CALC BMI ABV UP PARAM F/U: HCPCS | Performed by: FAMILY MEDICINE

## 2022-11-10 PROCEDURE — 3078F DIAST BP <80 MM HG: CPT | Performed by: FAMILY MEDICINE

## 2022-11-10 PROCEDURE — G8484 FLU IMMUNIZE NO ADMIN: HCPCS | Performed by: FAMILY MEDICINE

## 2022-11-10 PROCEDURE — 3074F SYST BP LT 130 MM HG: CPT | Performed by: FAMILY MEDICINE

## 2022-11-10 PROCEDURE — 36415 COLL VENOUS BLD VENIPUNCTURE: CPT | Performed by: FAMILY MEDICINE

## 2022-11-14 ASSESSMENT — ENCOUNTER SYMPTOMS
NAUSEA: 0
ABDOMINAL PAIN: 0
VOMITING: 0
SHORTNESS OF BREATH: 0
CONSTIPATION: 0
WHEEZING: 0
DIARRHEA: 0
COUGH: 0

## 2022-11-22 ENCOUNTER — OFFICE VISIT (OUTPATIENT)
Dept: FAMILY MEDICINE CLINIC | Age: 22
End: 2022-11-22
Payer: MEDICAID

## 2022-11-22 VITALS
OXYGEN SATURATION: 98 % | DIASTOLIC BLOOD PRESSURE: 82 MMHG | RESPIRATION RATE: 18 BRPM | TEMPERATURE: 98.1 F | HEART RATE: 94 BPM | SYSTOLIC BLOOD PRESSURE: 120 MMHG | HEIGHT: 63 IN | BODY MASS INDEX: 46.95 KG/M2 | WEIGHT: 265 LBS

## 2022-11-22 DIAGNOSIS — I10 ESSENTIAL HYPERTENSION: ICD-10-CM

## 2022-11-22 DIAGNOSIS — R00.0 TACHYCARDIA: Primary | ICD-10-CM

## 2022-11-22 DIAGNOSIS — J02.9 SORE THROAT: ICD-10-CM

## 2022-11-22 DIAGNOSIS — J30.2 SEASONAL ALLERGIES: ICD-10-CM

## 2022-11-22 DIAGNOSIS — K21.9 GASTROESOPHAGEAL REFLUX DISEASE WITHOUT ESOPHAGITIS: ICD-10-CM

## 2022-11-22 LAB — S PYO AG THROAT QL: NORMAL

## 2022-11-22 PROCEDURE — 99214 OFFICE O/P EST MOD 30 MIN: CPT | Performed by: FAMILY MEDICINE

## 2022-11-22 PROCEDURE — 3078F DIAST BP <80 MM HG: CPT | Performed by: FAMILY MEDICINE

## 2022-11-22 PROCEDURE — 1036F TOBACCO NON-USER: CPT | Performed by: FAMILY MEDICINE

## 2022-11-22 PROCEDURE — 87880 STREP A ASSAY W/OPTIC: CPT | Performed by: FAMILY MEDICINE

## 2022-11-22 PROCEDURE — G8427 DOCREV CUR MEDS BY ELIG CLIN: HCPCS | Performed by: FAMILY MEDICINE

## 2022-11-22 PROCEDURE — 3074F SYST BP LT 130 MM HG: CPT | Performed by: FAMILY MEDICINE

## 2022-11-22 PROCEDURE — G8417 CALC BMI ABV UP PARAM F/U: HCPCS | Performed by: FAMILY MEDICINE

## 2022-11-22 PROCEDURE — G8484 FLU IMMUNIZE NO ADMIN: HCPCS | Performed by: FAMILY MEDICINE

## 2022-11-22 RX ORDER — FLUTICASONE PROPIONATE 50 MCG
2 SPRAY, SUSPENSION (ML) NASAL DAILY
Qty: 48 G | Refills: 1 | Status: SHIPPED | OUTPATIENT
Start: 2022-11-22

## 2022-11-22 RX ORDER — OMEPRAZOLE 20 MG/1
20 CAPSULE, DELAYED RELEASE ORAL
Qty: 90 CAPSULE | Refills: 0 | Status: SHIPPED | OUTPATIENT
Start: 2022-11-22

## 2022-11-22 ASSESSMENT — ENCOUNTER SYMPTOMS
VOMITING: 0
ABDOMINAL PAIN: 0
SINUS PRESSURE: 0
SHORTNESS OF BREATH: 0
NAUSEA: 0
DIARRHEA: 0
SORE THROAT: 1
COUGH: 0
WHEEZING: 0
CONSTIPATION: 0

## 2022-11-22 NOTE — PROGRESS NOTES
[facility])  Family Medicine Outpatient        SUBJECTIVE:  CC: had concerns including Follow-up (Pt here for a 2 week follow up on her blood pressure and elevated heart rate. ). HPI:  Andreina Willis is a female 25 y.o. presented to the clinic for a follow up. She was initiated on Metoprolol last visit. She has been on Omeprazole for approximately a year. She has been off the medication for the past 2 weeks and reports having reflux episodes every other day. She is trying to watch her diet. Review of Systems   Constitutional:  Negative for appetite change, fatigue and fever. HENT:  Positive for sneezing and sore throat. Negative for congestion and sinus pressure. Respiratory:  Negative for cough, shortness of breath and wheezing. Cardiovascular:  Negative for chest pain and palpitations. Gastrointestinal:  Negative for abdominal pain, constipation, diarrhea, nausea and vomiting. Gerd   Musculoskeletal:  Negative for gait problem and myalgias.      Outpatient Medications Marked as Taking for the 11/22/22 encounter (Office Visit) with Vamshi King MD   Medication Sig Dispense Refill    omeprazole (PRILOSEC) 20 MG delayed release capsule Take 1 capsule by mouth every morning (before breakfast) 90 capsule 0    fluticasone (FLONASE) 50 MCG/ACT nasal spray 2 sprays by Each Nostril route daily 48 g 1    metoprolol tartrate (LOPRESSOR) 25 MG tablet Take 0.5 tablets by mouth 2 times daily 90 tablet 0    loratadine (CLARITIN) 10 MG tablet TAKE ONE TABLET BY MOUTH DAILY 90 tablet 0    amitriptyline (ELAVIL) 25 MG tablet TAKE ONE TABLET BY MOUTH AT BEDTIME      Levonorgest-Eth Estrad 91-Day (JAIMIESS PO) Take by mouth      albuterol sulfate HFA (PROVENTIL;VENTOLIN;PROAIR) 108 (90 Base) MCG/ACT inhaler Inhale 2 puffs into the lungs every 6 hours as needed for Wheezing       busPIRone (BUSPAR) 10 MG tablet Take 10 mg by mouth 2 times daily      montelukast (SINGULAIR) 10 MG tablet nightly EPINEPHrine (EPIPEN 2-KRYSTA) 0.3 MG/0.3ML SOAJ injection Inject 0.3 mLs into the skin once as needed for up to 1 dose. Use as directed for allergic reaction 2 each 0       I have reviewed all pertinent PMHx, PSHx, FamHx, SocialHx, medications, and allergies and updated history as appropriate. OBJECTIVE    VS: /82   Pulse 94   Temp 98.1 °F (36.7 °C) (Temporal)   Resp 18   Ht 5' 3\" (1.6 m)   Wt 265 lb (120.2 kg)   SpO2 98%   Breastfeeding No   BMI 46.94 kg/m²   Physical Exam  Constitutional:       General: She is not in acute distress. Appearance: She is well-developed. She is not diaphoretic. HENT:      Head: Normocephalic and atraumatic. Nose:      Comments: L>>R nares with pale boggy mucosa. Mouth/Throat:      Mouth: Mucous membranes are moist.      Pharynx: No oropharyngeal exudate. Comments: Cobble stoning posterior oropharynx  Eyes:      Conjunctiva/sclera: Conjunctivae normal.      Pupils: Pupils are equal, round, and reactive to light. Cardiovascular:      Rate and Rhythm: Normal rate and regular rhythm. Pulmonary:      Effort: Pulmonary effort is normal.      Breath sounds: Normal breath sounds. Abdominal:      General: Bowel sounds are normal. There is no distension. Palpations: Abdomen is soft. Tenderness: There is no abdominal tenderness. Hernia: No hernia is present. Musculoskeletal:      Cervical back: Normal range of motion and neck supple. Lymphadenopathy:      Cervical: No cervical adenopathy. Skin:     General: Skin is warm and dry. Neurological:      Mental Status: She is alert and oriented to person, place, and time. ASSESSMENT/PLAN:  1. Tachycardia  #1/2 stable. Continue current regimen. 2. Essential hypertension    3. Gastroesophageal reflux disease without esophagitis  GERD diet. Reinitiate Prilosec at this time. - omeprazole (PRILOSEC) 20 MG delayed release capsule;  Take 1 capsule by mouth every morning (before breakfast)  Dispense: 90 capsule; Refill: 0    4. Seasonal allergies  Continue Claritin and Singulair. Add Flonase.  - fluticasone (FLONASE) 50 MCG/ACT nasal spray; 2 sprays by Each Nostril route daily  Dispense: 48 g; Refill: 1    5. Sore throat  Neg poc strep. Supportive treatment.  - POCT rapid strep A    I have reviewed my findings and recommendations with Miles Barrientos MD  12/3/2022 2:23 PM  Return in about 3 months (around 2/22/2023). Counseled regarding above diagnosis, including possible risks and complications, especially if left uncontrolled. Patient counseled on red flag symptoms and if they occur to go to the ED. Discussed medications risk/benefits and possible side effects and alternatives to treatment. Patient and/or guardian verbalizes understanding, agrees, feels comfortable with and wishes to proceed with above treatment plan. Advised patient regarding importance of keeping up with recommended health maintenance and to schedule as soon as possible if overdue, as this is important in assessing for undiagnosed pathology, especially cancer, as well as protecting against potentially harmful/life threatening disease. Patient and/or guardian verbalizes understanding and agrees with above counseling, assessment and plan. All questions answered. Please note this report has been partially produced using speech recognition software  and may contain errors related to that system including grammar, punctuation and spelling as well as words and phrases that may seem inappropriate. If there are questions or concerns please feel free to contact me to clarify.

## 2022-12-15 ENCOUNTER — HOSPITAL ENCOUNTER (OUTPATIENT)
Age: 22
Discharge: HOME | End: 2022-12-15
Payer: MEDICAID

## 2022-12-15 ENCOUNTER — HOSPITAL ENCOUNTER (OUTPATIENT)
Dept: GENERAL RADIOLOGY | Age: 22
Discharge: HOME | End: 2022-12-17
Payer: MEDICAID

## 2022-12-15 ENCOUNTER — HOSPITAL ENCOUNTER (OUTPATIENT)
Age: 22
Discharge: HOME | End: 2022-12-17
Payer: MEDICAID

## 2022-12-15 ENCOUNTER — OFFICE VISIT (OUTPATIENT)
Dept: FAMILY MEDICINE CLINIC | Age: 22
End: 2022-12-15
Payer: MEDICAID

## 2022-12-15 VITALS
SYSTOLIC BLOOD PRESSURE: 112 MMHG | BODY MASS INDEX: 47.66 KG/M2 | TEMPERATURE: 97.9 F | HEART RATE: 99 BPM | OXYGEN SATURATION: 98 % | DIASTOLIC BLOOD PRESSURE: 78 MMHG | WEIGHT: 269 LBS | RESPIRATION RATE: 18 BRPM | HEIGHT: 63 IN

## 2022-12-15 DIAGNOSIS — J45.909 CHILDHOOD ASTHMA, UNSPECIFIED ASTHMA SEVERITY, UNSPECIFIED WHETHER COMPLICATED, UNSPECIFIED WHETHER PERSISTENT: ICD-10-CM

## 2022-12-15 DIAGNOSIS — R00.0 SINUS TACHYCARDIA: ICD-10-CM

## 2022-12-15 DIAGNOSIS — R00.0 SINUS TACHYCARDIA: Primary | ICD-10-CM

## 2022-12-15 DIAGNOSIS — J06.9 VIRAL URI: ICD-10-CM

## 2022-12-15 DIAGNOSIS — E66.01 MORBID OBESITY (HCC): ICD-10-CM

## 2022-12-15 LAB — MAGNESIUM: 1.9 MG/DL (ref 1.6–2.6)

## 2022-12-15 PROCEDURE — 83735 ASSAY OF MAGNESIUM: CPT

## 2022-12-15 PROCEDURE — G8484 FLU IMMUNIZE NO ADMIN: HCPCS | Performed by: FAMILY MEDICINE

## 2022-12-15 PROCEDURE — 3078F DIAST BP <80 MM HG: CPT | Performed by: FAMILY MEDICINE

## 2022-12-15 PROCEDURE — 71046 X-RAY EXAM CHEST 2 VIEWS: CPT

## 2022-12-15 PROCEDURE — G8417 CALC BMI ABV UP PARAM F/U: HCPCS | Performed by: FAMILY MEDICINE

## 2022-12-15 PROCEDURE — 1036F TOBACCO NON-USER: CPT | Performed by: FAMILY MEDICINE

## 2022-12-15 PROCEDURE — 82384 ASSAY THREE CATECHOLAMINES: CPT

## 2022-12-15 PROCEDURE — 99214 OFFICE O/P EST MOD 30 MIN: CPT | Performed by: FAMILY MEDICINE

## 2022-12-15 PROCEDURE — G8427 DOCREV CUR MEDS BY ELIG CLIN: HCPCS | Performed by: FAMILY MEDICINE

## 2022-12-15 PROCEDURE — 83835 ASSAY OF METANEPHRINES: CPT

## 2022-12-15 PROCEDURE — 36415 COLL VENOUS BLD VENIPUNCTURE: CPT | Performed by: FAMILY MEDICINE

## 2022-12-15 PROCEDURE — 36415 COLL VENOUS BLD VENIPUNCTURE: CPT

## 2022-12-15 PROCEDURE — 3074F SYST BP LT 130 MM HG: CPT | Performed by: FAMILY MEDICINE

## 2022-12-15 RX ORDER — FLUTICASONE PROPIONATE 110 UG/1
2 AEROSOL, METERED RESPIRATORY (INHALATION) 2 TIMES DAILY
Qty: 12 G | Refills: 3 | Status: CANCELLED | OUTPATIENT
Start: 2022-12-15 | End: 2023-12-15

## 2022-12-15 RX ORDER — PREDNISONE 20 MG/1
20 TABLET ORAL 2 TIMES DAILY
Qty: 10 TABLET | Refills: 0 | Status: CANCELLED | OUTPATIENT
Start: 2022-12-15 | End: 2022-12-20

## 2022-12-15 ASSESSMENT — ENCOUNTER SYMPTOMS
DIARRHEA: 0
SHORTNESS OF BREATH: 0
COUGH: 0
WHEEZING: 0
ABDOMINAL PAIN: 0
NAUSEA: 0
VOMITING: 0
CONSTIPATION: 0

## 2022-12-15 NOTE — PROGRESS NOTES
[facility])  Family Medicine Outpatient        SUBJECTIVE:  CC: had concerns including Tachycardia (Pt here for an elevated heart rate. Pt states when sitting to standing her heart rate is 110 then squatting is 130.). HPI:  Judith Clark is a female 25 y.o. presented to the clinic for a follow up appointment. She works at [employer]. Reports her hr go up to 130 with light activity. Hasn't used albuterol inhaler. Reports cough started last night. Younger brother is sick. Was negative for covid, flu, and strep. Gets Sob going up stairs. Notices heat intolerance with her elevated hr. She denies any headache. No recent travel or surgeries. She has gained approximately 20 pounds since May. Review of Systems   Constitutional:  Negative for appetite change, fatigue and fever. Respiratory:  Negative for cough, shortness of breath and wheezing. Cardiovascular:  Positive for palpitations. Negative for chest pain. Gastrointestinal:  Negative for abdominal pain, constipation, diarrhea, nausea and vomiting.      Outpatient Medications Marked as Taking for the 12/15/22 encounter (Office Visit) with Praful Guerrero MD   Medication Sig Dispense Refill    omeprazole (PRILOSEC) 20 MG delayed release capsule Take 1 capsule by mouth every morning (before breakfast) 90 capsule 0    fluticasone (FLONASE) 50 MCG/ACT nasal spray 2 sprays by Each Nostril route daily 48 g 1    metoprolol tartrate (LOPRESSOR) 25 MG tablet Take 0.5 tablets by mouth 2 times daily 90 tablet 0    loratadine (CLARITIN) 10 MG tablet TAKE ONE TABLET BY MOUTH DAILY 90 tablet 0    amitriptyline (ELAVIL) 25 MG tablet TAKE ONE TABLET BY MOUTH AT BEDTIME      Levonorgest-Eth Estrad 91-Day (JAIMIESS PO) Take by mouth      albuterol sulfate HFA (PROVENTIL;VENTOLIN;PROAIR) 108 (90 Base) MCG/ACT inhaler Inhale 2 puffs into the lungs every 6 hours as needed for Wheezing       busPIRone (BUSPAR) 10 MG tablet Take 10 mg by mouth 2 times daily      montelukast (SINGULAIR) 10 MG tablet nightly       EPINEPHrine (EPIPEN 2-KRYSTA) 0.3 MG/0.3ML SOAJ injection Inject 0.3 mLs into the skin once as needed for up to 1 dose. Use as directed for allergic reaction 2 each 0       I have reviewed all pertinent PMHx, PSHx, FamHx, SocialHx, medications, and allergies and updated history as appropriate. OBJECTIVE    VS: /78   Pulse 99   Temp 97.9 °F (36.6 °C) (Temporal)   Resp 18   Ht 5' 3\" (1.6 m)   Wt 269 lb (122 kg)   SpO2 98%   Breastfeeding No   BMI 47.65 kg/m²   Physical Exam  Constitutional:       General: She is not in acute distress. Appearance: She is well-developed. She is not diaphoretic. HENT:      Head: Normocephalic and atraumatic. Eyes:      Conjunctiva/sclera: Conjunctivae normal.      Pupils: Pupils are equal, round, and reactive to light. Cardiovascular:      Rate and Rhythm: Normal rate and regular rhythm. Pulmonary:      Effort: Pulmonary effort is normal.      Breath sounds: Normal breath sounds. Abdominal:      General: Bowel sounds are normal. There is no distension. Palpations: Abdomen is soft. Tenderness: There is no abdominal tenderness. Hernia: No hernia is present. Musculoskeletal:      Cervical back: Normal range of motion and neck supple. Skin:     General: Skin is warm and dry. Neurological:      Mental Status: She is alert and oriented to person, place, and time. ASSESSMENT/PLAN:  1. Sinus tachycardia  - Holter Monitor 48 Hour; Future  - METANEPHRINES URINE; Future  - Catecholamines Free Urine; Future  - Magnesium; Future  - CATECHOLAMINES, FRACTIONATED, PLASMA; Future  - Metanephrines Plasma Free; Future    2. Childhood asthma, unspecified asthma severity, unspecified whether complicated, unspecified whether persistent  - Full PFT Study With Bronchodilator; Future  - XR CHEST STANDARD (2 VW); Future    3.  Morbid obesity ([address]  Encourage weight reduction with caloric restriction and exercise 150 min/week as tolerated. 4. Viral URI    I have reviewed my findings and recommendations with Lesia Rowland MD  12/26/2022 12:03 AM  Return in about 4 weeks (around 1/12/2023) for after holter monitor. Counseled regarding above diagnosis, including possible risks and complications, especially if left uncontrolled. Patient counseled on red flag symptoms and if they occur to go to the ED. Discussed medications risk/benefits and possible side effects and alternatives to treatment. Patient and/or guardian verbalizes understanding, agrees, feels comfortable with and wishes to proceed with above treatment plan. Advised patient regarding importance of keeping up with recommended health maintenance and to schedule as soon as possible if overdue, as this is important in assessing for undiagnosed pathology, especially cancer, as well as protecting against potentially harmful/life threatening disease. Patient and/or guardian verbalizes understanding and agrees with above counseling, assessment and plan. All questions answered. Please note this report has been partially produced using speech recognition software  and may contain errors related to that system including grammar, punctuation and spelling as well as words and phrases that may seem inappropriate. If there are questions or concerns please feel free to contact me to clarify.

## 2022-12-26 PROBLEM — E66.01 MORBID OBESITY (HCC): Status: ACTIVE | Noted: 2022-12-26

## 2022-12-26 PROBLEM — J45.909 CHILDHOOD ASTHMA: Status: ACTIVE | Noted: 2022-12-26

## 2022-12-28 ENCOUNTER — HOSPITAL ENCOUNTER (OUTPATIENT)
Dept: SLEEP CENTER | Age: 22
Discharge: HOME | End: 2022-12-28
Payer: MEDICAID

## 2022-12-28 DIAGNOSIS — R00.0 SINUS TACHYCARDIA: ICD-10-CM

## 2022-12-28 DIAGNOSIS — R00.0 TACHYCARDIA: Primary | ICD-10-CM

## 2022-12-28 PROCEDURE — 93225 XTRNL ECG REC<48 HRS REC: CPT

## 2022-12-28 PROCEDURE — 93226 XTRNL ECG REC<48 HR SCAN A/R: CPT

## 2022-12-30 ENCOUNTER — HOSPITAL ENCOUNTER (OUTPATIENT)
Age: 22
Discharge: HOME | End: 2023-01-01
Payer: MEDICAID

## 2022-12-30 PROCEDURE — 82384 ASSAY THREE CATECHOLAMINES: CPT

## 2022-12-30 PROCEDURE — 83835 ASSAY OF METANEPHRINES: CPT

## 2023-01-05 ENCOUNTER — OFFICE VISIT (OUTPATIENT)
Dept: FAMILY MEDICINE CLINIC | Age: 23
End: 2023-01-05
Payer: MEDICAID

## 2023-01-05 VITALS
OXYGEN SATURATION: 98 % | HEART RATE: 96 BPM | SYSTOLIC BLOOD PRESSURE: 108 MMHG | WEIGHT: 262 LBS | TEMPERATURE: 97.3 F | BODY MASS INDEX: 46.42 KG/M2 | HEIGHT: 63 IN | DIASTOLIC BLOOD PRESSURE: 86 MMHG | RESPIRATION RATE: 18 BRPM

## 2023-01-05 DIAGNOSIS — R00.0 SINUS TACHYCARDIA: Primary | ICD-10-CM

## 2023-01-05 DIAGNOSIS — E66.01 MORBID OBESITY (HCC): ICD-10-CM

## 2023-01-05 DIAGNOSIS — I10 ESSENTIAL HYPERTENSION: ICD-10-CM

## 2023-01-05 DIAGNOSIS — F39 MOOD DISORDER (HCC): ICD-10-CM

## 2023-01-05 PROCEDURE — G8427 DOCREV CUR MEDS BY ELIG CLIN: HCPCS | Performed by: FAMILY MEDICINE

## 2023-01-05 PROCEDURE — 1036F TOBACCO NON-USER: CPT | Performed by: FAMILY MEDICINE

## 2023-01-05 PROCEDURE — 3078F DIAST BP <80 MM HG: CPT | Performed by: FAMILY MEDICINE

## 2023-01-05 PROCEDURE — G8484 FLU IMMUNIZE NO ADMIN: HCPCS | Performed by: FAMILY MEDICINE

## 2023-01-05 PROCEDURE — G8417 CALC BMI ABV UP PARAM F/U: HCPCS | Performed by: FAMILY MEDICINE

## 2023-01-05 PROCEDURE — 3074F SYST BP LT 130 MM HG: CPT | Performed by: FAMILY MEDICINE

## 2023-01-05 PROCEDURE — 99214 OFFICE O/P EST MOD 30 MIN: CPT | Performed by: FAMILY MEDICINE

## 2023-01-05 ASSESSMENT — ENCOUNTER SYMPTOMS
NAUSEA: 0
VOMITING: 0
WHEEZING: 0
DIARRHEA: 0
ABDOMINAL PAIN: 0
SHORTNESS OF BREATH: 0
COUGH: 0
CONSTIPATION: 0

## 2023-01-05 ASSESSMENT — PATIENT HEALTH QUESTIONNAIRE - PHQ9
2. FEELING DOWN, DEPRESSED OR HOPELESS: 1
SUM OF ALL RESPONSES TO PHQ QUESTIONS 1-9: 2
SUM OF ALL RESPONSES TO PHQ9 QUESTIONS 1 & 2: 2
SUM OF ALL RESPONSES TO PHQ QUESTIONS 1-9: 2
1. LITTLE INTEREST OR PLEASURE IN DOING THINGS: 1

## 2023-01-05 NOTE — PROGRESS NOTES
[facility])  Family Medicine Outpatient        SUBJECTIVE:  CC: had concerns including Results (Pt here to discuss lab results. ). HPI:  Kay Mora is a female 25 y.o. presented to the clinic for a follow up visit. She reports doing well on the Prilosec. She completed her holter monitor and urine labs. Reports some palpitations on exertion. Review of Systems   Constitutional:  Negative for appetite change, fatigue and fever. Respiratory:  Negative for cough, shortness of breath and wheezing. Cardiovascular:  Positive for palpitations (episodic with exertion). Negative for chest pain. Gastrointestinal:  Negative for abdominal pain, constipation, diarrhea, nausea and vomiting. Endocrine: Positive for heat intolerance. Negative for cold intolerance. Genitourinary:  Negative for difficulty urinating and dysuria. Outpatient Medications Marked as Taking for the 1/5/23 encounter (Office Visit) with Chaim Polk MD   Medication Sig Dispense Refill    omeprazole (PRILOSEC) 20 MG delayed release capsule Take 1 capsule by mouth every morning (before breakfast) 90 capsule 0    fluticasone (FLONASE) 50 MCG/ACT nasal spray 2 sprays by Each Nostril route daily 48 g 1    metoprolol tartrate (LOPRESSOR) 25 MG tablet Take 0.5 tablets by mouth 2 times daily 90 tablet 0    loratadine (CLARITIN) 10 MG tablet TAKE ONE TABLET BY MOUTH DAILY 90 tablet 0    amitriptyline (ELAVIL) 25 MG tablet TAKE ONE TABLET BY MOUTH AT BEDTIME      Levonorgest-Eth Estrad 91-Day (JAIMIESS PO) Take by mouth      albuterol sulfate HFA (PROVENTIL;VENTOLIN;PROAIR) 108 (90 Base) MCG/ACT inhaler Inhale 2 puffs into the lungs every 6 hours as needed for Wheezing       busPIRone (BUSPAR) 10 MG tablet Take 10 mg by mouth 2 times daily      montelukast (SINGULAIR) 10 MG tablet nightly       EPINEPHrine (EPIPEN 2-KRYSTA) 0.3 MG/0.3ML SOAJ injection Inject 0.3 mLs into the skin once as needed for up to 1 dose.  Use as directed for allergic reaction 2 each 0       I have reviewed all pertinent PMHx, PSHx, FamHx, SocialHx, medications, and allergies and updated history as appropriate. OBJECTIVE    VS: /86   Pulse 96   Temp 97.3 °F (36.3 °C) (Temporal)   Resp 18   Ht 5' 3\" (1.6 m)   Wt 262 lb (118.8 kg)   SpO2 98%   Breastfeeding No   BMI 46.41 kg/m²   Physical Exam  Constitutional:       General: She is not in acute distress. Appearance: She is well-developed. She is obese. She is not diaphoretic. HENT:      Head: Normocephalic and atraumatic. Eyes:      Conjunctiva/sclera: Conjunctivae normal.      Pupils: Pupils are equal, round, and reactive to light. Cardiovascular:      Rate and Rhythm: Normal rate and regular rhythm. Pulmonary:      Effort: Pulmonary effort is normal.      Breath sounds: Normal breath sounds. Abdominal:      General: Bowel sounds are normal. There is no distension. Palpations: Abdomen is soft. Tenderness: There is no abdominal tenderness. Hernia: No hernia is present. Musculoskeletal:      Cervical back: Normal range of motion and neck supple. Skin:     General: Skin is warm and dry. Neurological:      Mental Status: She is alert and oriented to person, place, and time. ASSESSMENT/PLAN:  1. Sinus tachycardia  With heat intolerance during episodes. TSH and free T4 wnl 11/2022. #1-2 Reportedly heart monitor submitted, but not read yet. CXR in December wnl. Lung function test pending with history of childhood asthma. Plasma metanephrines/catecholamines done. Urine Metanephrines and Catecholamines canceled by lab. Patient will get these redone.   - METANEPHRINES URINE; Future  - CATECHOLAMINES FREE URINE; Future    2. Essential hypertension  #1-2 Stable. Continue current regimen.   - METANEPHRINES URINE; Future  - CATECHOLAMINES FREE URINE; Future    3. Morbid obesity ([address]  Down 7 lb since December. Encourage weight reduction with caloric restriction and exercise as tolerated. 4. Mood disorder ([facility] [address]  On Elavil and Buspar regimen. No s/h ideations. Follows with Psychiatry. 5. GERD  Stable. Continue Prilosec regimen. I have reviewed my findings and recommendations with Yenny Chandra MD  1/11/2023 10:11 AM  Return in about 3 months (around 4/5/2023) for established f/u. Will await urine studies and will advise further. Holter and pft pending. .     Counseled regarding above diagnosis, including possible risks and complications, especially if left uncontrolled. Patient counseled on red flag symptoms and if they occur to go to the ED. Discussed medications risk/benefits and possible side effects and alternatives to treatment. Patient and/or guardian verbalizes understanding, agrees, feels comfortable with and wishes to proceed with above treatment plan. Advised patient regarding importance of keeping up with recommended health maintenance and to schedule as soon as possible if overdue, as this is important in assessing for undiagnosed pathology, especially cancer, as well as protecting against potentially harmful/life threatening disease. Patient and/or guardian verbalizes understanding and agrees with above counseling, assessment and plan. All questions answered. Please note this report has been partially produced using speech recognition software  and may contain errors related to that system including grammar, punctuation and spelling as well as words and phrases that may seem inappropriate. If there are questions or concerns please feel free to contact me to clarify.

## 2023-01-10 ENCOUNTER — HOSPITAL ENCOUNTER (OUTPATIENT)
Dept: PULMONOLOGY | Age: 23
Discharge: HOME | End: 2023-01-10
Payer: MEDICAID

## 2023-01-10 DIAGNOSIS — J45.909 CHILDHOOD ASTHMA, UNSPECIFIED ASTHMA SEVERITY, UNSPECIFIED WHETHER COMPLICATED, UNSPECIFIED WHETHER PERSISTENT: ICD-10-CM

## 2023-01-10 PROCEDURE — 94726 PLETHYSMOGRAPHY LUNG VOLUMES: CPT

## 2023-01-10 PROCEDURE — 94729 DIFFUSING CAPACITY: CPT

## 2023-01-10 PROCEDURE — 94060 EVALUATION OF WHEEZING: CPT

## 2023-01-11 PROBLEM — F39 MOOD DISORDER (HCC): Status: ACTIVE | Noted: 2023-01-11

## 2023-01-12 NOTE — PROCEDURES
[address] [place] [address]                               PULMONARY FUNCTION    PATIENT NAME: Reji Edwards                     :        2000  MED REC NO:   [id number]                            ROOM:  ACCOUNT NO:   [de-identified]                           ADMIT DATE: 01/10/2023  PROVIDER:     Phong Young DO    DATE OF PROCEDURE:  01/10/2023    This is a pulmonary function interpretation by Dr. Lindsay Harris,  pulmonologist, interpreting an outpatient pulmonary function study  requested by the attending physician, Dr. Julio Esqueda. DEMOGRAPHICS: This is a 20-year-old  female. Height is 5 feet  3 inches, weight is 262 pounds and that would suggest that the patient  is severely to morbidly obese. REASON FOR THE PULMONARY FUNCTION STUDY:  To evaluate her history of  asthma with a major symptom over the last 3 months of increased  breathlessness. QUESTION:  Has she ever had a pulmonary function study? ANSWER:  No that she is aware of. MEDICATION ALLERGIES:  She says IODINE and she calls it CHLORAPREP. CURRENT MEDICATIONS:  She is on Flonase, a nasal inhaled corticosteroid. She is on Prilosec which is for her gastroesophageal reflux. She is on  metoprolol which is 25 mg once daily she takes a half a tablet daily. She is also on Claritin and she is on amitriptyline. She is on a birth  control pill called Arthor Tuan. She is on a rescue inhaler called  Proventil. She is on BuSpar, Singulair and has an EpiPen. QUESTION:  Was she in a chronic stable state? ANSWER:  She says no. She had a community respiratory infection within  the last month which could influence the interpretation of this study. PULMONARY SYMPTOMS:  COUGH:  She states she does not have a cough. WHEEZING:  She states that she does not wheeze.   SHORTNESS OF BREATH:  But she has noticed over the last 3 months,  shortness of breath primarily with activities. CONSTITUTIONAL SYMPTOMS:  She stated that she had no excessive weight  loss, fever or night sweats. SMOKING HISTORY:  The patient stated that she is a nonsmoker. From what  I gather here, she is a never smoker. I will say that she had passed  exposure in her smoking history to her brothers and her boyfriend for 6  years. ATOPIC HISTORY:  She says she is a atopic. She is sensitive to dust  mites. PET EXPOSURE:  She has four cats and two hamsters which could be  perennial allergens. ENVIRONMENTAL FACTORS:  She said that the hot air, cold air or humid air  does not seem to influence her. JOB DESCRIPTION:  It was not mentioned. REVIEW OF SYSTEMS:  She states that she does have asthma. HOME ENVIRONMENT:  She has gas heat, lives in a home. As far as sleep,  she sleeps on two pillows and with her weight that should be further  evaluated. With asthma history to determine whether she does have a  sleep disorder that may aggravate that and result in shortness of  breath. FAMILY HISTORY:  She does have heart disease in her oldest brother and  she says she does have asthma in the family, but she does not mention  whom in her family history also has asthma. TB HISTORY:  She has had no exposure that she is aware of. INTERPRETATION OF HER PULMONARY FUNCTION STUDY:    SPIROMETRY:  She is predicted for her age to have a vital capacity on  her forced expiratory maneuver at the age of 25 with a height of 5 feet  3 inches of 3.68 liters. She did 3.8 liters, 103% of predicted. Actual  100 mL improvement after bronchodilator to 3.9 liters, 106% of  predicted. Now in 1 second of the 3.6 liters, she should have 3.2  predicted out in 1 second. She did 3.1 liters, 97% of predicted and  then a 200 mL improvement afterwards to 3.3 liters and 103%, that is  considered to be about a 6% improvement.   The FEV1 to FVC ratio, she is  predicted to have 88% of her air out in 1 second. She only had 82% out  and after bronchodilator improved to 84%. Now in those small airways,  we could see that those small airways did have some reactivity that was  improved. The [id number] was with a 20% improvement and the very small  airways, there was a 50% improvement, so we have some reactivity in  those small airways and with her obesity and her asthma. The small  airways did demonstrate some airway reactivity to near normal.    STRENGTH OF HER CHEST CAGE MECHANICS:  It was fairly well preserved. The diaphragmatic strength was normal.  The expiratory muscle strength  was a little less than normal, but pending on performance that would  indicate possibly inability to perform the maximum expiratory pressure  maneuver. Now her maximum voluntary ventilation was normal suggesting  that she had plenty of breathing reserve at the end of this maneuver. STATIC LUNG VOLUMES:  We could see that her total lung capacity  suggested some hyperinflation with her total lung capacity of 5.4  liters, 111% of predicted. The RV to TLC was also increased to 32% with  predicted normal of 23% end of an expiratory maneuver, so there was air  trapping and hyperinflation noted which went along with her asthma  disorder. Diffusions were super normal as well, going along with her  asthma. her airways demonstrated evidence of normal airway conductance  with minimal airway resistance. FLOW VOLUME CURVE:  Does note a normal flow curve with some improvement  after bronchodilator especially in those small airways and that would be  consistent with a reactive airway disorder going along with her history  of childhood asthma and with her atopic history, this would be chronic  allergic asthma. ASSESSMENT:  She does have evidence on the pulmonary functions of  relatively preserved lung capacity.   There is evidence of fairly normal  flow mechanics, but with reactivity in those small airways predominantly  and there is air trapping and hyperinflation associated with this small  airway reactivity and that could result in exertional dyspnea. The gas  transfer was super normal going along with her obesity and asthma  disorder. RECOMMENDATIONS:  The patient has no real controller other than  Singulair on board and it would be important for the patient to have on  board as the cornerstone of asthma care and inhaled corticosteroid, and  strong concerns as to the use of a selective beta-blocker in a patient  with asthma which could also predispose to shortness of breath.         Gregorio Simms DO    D: 01/11/2023 18:27:59       T: 01/11/2023 18:31:40     [email or web address]  Job#: [id number]     Doc#: [id number]    CC:

## 2023-01-14 ENCOUNTER — HOSPITAL ENCOUNTER (OUTPATIENT)
Age: 23
Setting detail: SPECIMEN
Discharge: HOME | End: 2023-01-14

## 2023-01-14 DIAGNOSIS — I10 ESSENTIAL HYPERTENSION: ICD-10-CM

## 2023-01-14 DIAGNOSIS — R00.0 SINUS TACHYCARDIA: ICD-10-CM

## 2023-01-24 ENCOUNTER — TELEMEDICINE (OUTPATIENT)
Dept: FAMILY MEDICINE CLINIC | Age: 23
End: 2023-01-24
Payer: MEDICAID

## 2023-01-24 DIAGNOSIS — I10 HYPERTENSION, UNSPECIFIED TYPE: Primary | ICD-10-CM

## 2023-01-24 DIAGNOSIS — R89.9 ABNORMAL LABORATORY TEST: ICD-10-CM

## 2023-01-24 DIAGNOSIS — F41.9 ANXIETY: ICD-10-CM

## 2023-01-24 DIAGNOSIS — K21.9 GASTROESOPHAGEAL REFLUX DISEASE WITHOUT ESOPHAGITIS: ICD-10-CM

## 2023-01-24 DIAGNOSIS — R00.0 TACHYCARDIA: ICD-10-CM

## 2023-01-24 PROCEDURE — G8427 DOCREV CUR MEDS BY ELIG CLIN: HCPCS | Performed by: FAMILY MEDICINE

## 2023-01-24 PROCEDURE — 1036F TOBACCO NON-USER: CPT | Performed by: FAMILY MEDICINE

## 2023-01-24 PROCEDURE — 99214 OFFICE O/P EST MOD 30 MIN: CPT | Performed by: FAMILY MEDICINE

## 2023-01-24 PROCEDURE — G8484 FLU IMMUNIZE NO ADMIN: HCPCS | Performed by: FAMILY MEDICINE

## 2023-01-24 PROCEDURE — G8417 CALC BMI ABV UP PARAM F/U: HCPCS | Performed by: FAMILY MEDICINE

## 2023-01-24 RX ORDER — LEVONORGESTREL / ETHINYL ESTRADIOL AND ETHINYL ESTRADIOL 150-30(84)
1 KIT ORAL DAILY
COMMUNITY
Start: 2022-12-17

## 2023-01-24 RX ORDER — OMEPRAZOLE 20 MG/1
20 CAPSULE, DELAYED RELEASE ORAL
Qty: 90 CAPSULE | Refills: 1 | Status: SHIPPED | OUTPATIENT
Start: 2023-01-24 | End: 2023-07-23

## 2023-01-24 NOTE — PROGRESS NOTES
TeleMedicine Video Visit    Cornelius Luis, was evaluated through a synchronous (real-time) audio-video encounter. The patient (or guardian if applicable) is aware that this is a billable service. Verbal consent to proceed has been obtained within the past 12 months. The visit was conducted pursuant to the emergency declaration under the [facility], [address] [place] [address] and the [employer]. Patient identification was verified, and a caregiver was present when appropriate. The patient was located in a state where the provider was credentialed to provide care. Patient identification was verified at the start of the visit, including the patient's telephone number and physical location. I discussed with the patient the nature of our telehealth visits, that:     Due to the nature of an audio- video modality, the only components of a physical exam that could be done are the elements supported by direct observation. I would evaluate the patient and recommend diagnostics and treatments based on my assessment. If it was felt that the patient should be evaluated in clinic or an emergency room setting, then they would be directed there. Our sessions are not being recorded and that personal health information is protected. Our team would provide follow up care in person if/when the patient needs it. Patient's location: home address in [facility]. Physician  location other address in [facility] other people involved in call none    Not billed by time    This visit was completed virtually using [address] Outpatient        SUBJECTIVE:  CC: had concerns including Results (Pt calling to discuss results) and Medication Refill (Pharmacy confirmed meds pended). HPI:  Cornelius Luis is a female 25 y.o. presented for a vv. She would like to review her recent urine studies.  She is also requesting a medication refill. Review of Systems   Constitutional:  Negative for appetite change, fatigue and fever. Respiratory:  Negative for cough, shortness of breath and wheezing. Cardiovascular:  Negative for chest pain and palpitations. Gastrointestinal:  Negative for abdominal pain, constipation, diarrhea, nausea and vomiting. Endocrine: Positive for heat intolerance. Negative for cold intolerance. Genitourinary:  Negative for difficulty urinating and dysuria. Outpatient Medications Marked as Taking for the 1/24/23 encounter (Telemedicine) with Tash Wahl MD   Medication Sig Dispense Refill    omeprazole (PRILOSEC) 20 MG delayed release capsule Take 1 capsule by mouth every morning (before breakfast) 90 capsule 1    metoprolol tartrate (LOPRESSOR) 25 MG tablet Take 0.5 tablets by mouth 2 times daily 90 tablet 1    fluticasone (FLONASE) 50 MCG/ACT nasal spray 2 sprays by Each Nostril route daily 48 g 1    loratadine (CLARITIN) 10 MG tablet TAKE ONE TABLET BY MOUTH DAILY 90 tablet 0    amitriptyline (ELAVIL) 25 MG tablet TAKE ONE TABLET BY MOUTH AT BEDTIME      albuterol sulfate HFA (PROVENTIL;VENTOLIN;PROAIR) 108 (90 Base) MCG/ACT inhaler Inhale 2 puffs into the lungs every 6 hours as needed for Wheezing       busPIRone (BUSPAR) 10 MG tablet Take 10 mg by mouth 2 times daily      montelukast (SINGULAIR) 10 MG tablet nightly       EPINEPHrine (EPIPEN 2-KRYSTA) 0.3 MG/0.3ML SOAJ injection Inject 0.3 mLs into the skin once as needed for up to 1 dose. Use as directed for allergic reaction 2 each 0       I have reviewed all pertinent PMHx, PSHx, FamHx, SocialHx, medications, and allergies and updated history as appropriate. OBJECTIVE    VS: There were no vitals taken for this visit. Physical Exam  Constitutional:       General: She is not in acute distress. Appearance: Normal appearance. She is obese. She is not ill-appearing. Neurological:      Mental Status: She is oriented to person, place, and time. Psychiatric:         Mood and Affect: Mood normal.         Behavior: Behavior normal.     ASSESSMENT/PLAN:  1. Hypertension, unspecified type  Stable. Continue current regimen. - metoprolol tartrate (LOPRESSOR) 25 MG tablet; Take 0.5 tablets by mouth 2 times daily  Dispense: 90 tablet; Refill: 1    2. Tachycardia  Reviewed recent Holter monitor. Stable. Continue current regimen. - metoprolol tartrate (LOPRESSOR) 25 MG tablet; Take 0.5 tablets by mouth 2 times daily  Dispense: 90 tablet; Refill: 1    3. Abnormal laboratory test  Elevated Dopamine and NE in serum, query secondary to mental health medications? Urine Metanephrines and Catecholamines done. 24 hr Creatinine was elevated, but otherwise wnl. Consult placed with Endocrinology at this time. Patient amendable to this. - Ayanna Katz MD, Endocrinology, [place]    4. Anxiety  Stable. Follows with Psychiatry for mental health. Continue to follow with specialist.     5. Gastroesophageal reflux disease without esophagitis  Gerd diet. Supportive/conservative treatment as discussed. Prilosec regimen. - omeprazole (PRILOSEC) 20 MG delayed release capsule; Take 1 capsule by mouth every morning (before breakfast)  Dispense: 90 capsule; Refill: 1    I have reviewed my findings and recommendations with Kashif Alcantara MD  2/1/2023 12:08 PM  Return for after seeing Endocrine. Counseled regarding above diagnosis, including possible risks and complications, especially if left uncontrolled. Patient counseled on red flag symptoms and if they occur to go to the ED. Discussed medications risk/benefits and possible side effects and alternatives to treatment. Patient and/or guardian verbalizes understanding, agrees, feels comfortable with and wishes to proceed with above treatment plan.       Advised patient regarding importance of keeping up with recommended health maintenance and to schedule as soon as possible if overdue, as this is important in assessing for undiagnosed pathology, especially cancer, as well as protecting against potentially harmful/life threatening disease. Patient and/or guardian verbalizes understanding and agrees with above counseling, assessment and plan. All questions answered. Please note this report has been partially produced using speech recognition software  and may contain errors related to that system including grammar, punctuation and spelling as well as words and phrases that may seem inappropriate. If there are questions or concerns please feel free to contact me to clarify.

## 2023-02-01 ASSESSMENT — ENCOUNTER SYMPTOMS
VOMITING: 0
DIARRHEA: 0
SHORTNESS OF BREATH: 0
WHEEZING: 0
ABDOMINAL PAIN: 0
CONSTIPATION: 0
COUGH: 0
NAUSEA: 0

## 2023-02-11 SDOH — ECONOMIC STABILITY: INCOME INSECURITY: HOW HARD IS IT FOR YOU TO PAY FOR THE VERY BASICS LIKE FOOD, HOUSING, MEDICAL CARE, AND HEATING?: SOMEWHAT HARD

## 2023-02-11 SDOH — ECONOMIC STABILITY: HOUSING INSECURITY
IN THE LAST 12 MONTHS, WAS THERE A TIME WHEN YOU DID NOT HAVE A STEADY PLACE TO SLEEP OR SLEPT IN A SHELTER (INCLUDING NOW)?: NO

## 2023-02-11 SDOH — ECONOMIC STABILITY: FOOD INSECURITY: WITHIN THE PAST 12 MONTHS, YOU WORRIED THAT YOUR FOOD WOULD RUN OUT BEFORE YOU GOT MONEY TO BUY MORE.: SOMETIMES TRUE

## 2023-02-11 SDOH — ECONOMIC STABILITY: TRANSPORTATION INSECURITY
IN THE PAST 12 MONTHS, HAS LACK OF TRANSPORTATION KEPT YOU FROM MEETINGS, WORK, OR FROM GETTING THINGS NEEDED FOR DAILY LIVING?: NO

## 2023-02-11 SDOH — ECONOMIC STABILITY: FOOD INSECURITY: WITHIN THE PAST 12 MONTHS, THE FOOD YOU BOUGHT JUST DIDN'T LAST AND YOU DIDN'T HAVE MONEY TO GET MORE.: SOMETIMES TRUE

## 2023-02-13 ENCOUNTER — OFFICE VISIT (OUTPATIENT)
Dept: ENDOCRINOLOGY | Age: 23
End: 2023-02-13
Payer: MEDICAID

## 2023-02-13 VITALS
OXYGEN SATURATION: 99 % | WEIGHT: 262 LBS | SYSTOLIC BLOOD PRESSURE: 105 MMHG | BODY MASS INDEX: 46.41 KG/M2 | DIASTOLIC BLOOD PRESSURE: 73 MMHG | HEART RATE: 86 BPM

## 2023-02-13 DIAGNOSIS — R82.5 HIGH CATECHOLAMINES: Primary | ICD-10-CM

## 2023-02-13 DIAGNOSIS — E55.9 VITAMIN D DEFICIENCY: ICD-10-CM

## 2023-02-13 PROCEDURE — 3078F DIAST BP <80 MM HG: CPT | Performed by: INTERNAL MEDICINE

## 2023-02-13 PROCEDURE — G8417 CALC BMI ABV UP PARAM F/U: HCPCS | Performed by: INTERNAL MEDICINE

## 2023-02-13 PROCEDURE — 3074F SYST BP LT 130 MM HG: CPT | Performed by: INTERNAL MEDICINE

## 2023-02-13 PROCEDURE — 99202 OFFICE O/P NEW SF 15 MIN: CPT | Performed by: INTERNAL MEDICINE

## 2023-02-13 PROCEDURE — 1036F TOBACCO NON-USER: CPT | Performed by: INTERNAL MEDICINE

## 2023-02-13 PROCEDURE — G8484 FLU IMMUNIZE NO ADMIN: HCPCS | Performed by: INTERNAL MEDICINE

## 2023-02-13 PROCEDURE — G8427 DOCREV CUR MEDS BY ELIG CLIN: HCPCS | Performed by: INTERNAL MEDICINE

## 2023-02-13 NOTE — PROGRESS NOTES
[address] [place] [address] Department of Endocrinology Diabetes and Metabolism   [address] [facility] [address]   Phone: [phone]  Fax: [phone]  Date of Service: 2/13/2023     Primary Care Physician: Federico Le MD  Referring physician: Hernan Persaud MD  Provider: Isak Marlow MD     Reason for the visit:   Evaluation for possible pheochromocytoma     History of Present Illness: The history is provided by the patient. No  was used. Accuracy of the patient data is excellent. Jonah Valentine is a very pleasant 25y.o. year-old female was seen today for for evaluation of possible pheo       The patient was in her usual state of health until 10/2022 when she had tonsillitits and since then noticed high BP and tachycardia   She denies h/o headaches and olman prior h/o Pheochromocytoma       The patient was actually Metoprolol an felt better  on it.  Her BP controlled with beta-blockers     Labs in 12/2022 showed mildly elevated catecholamines but pt on Amitriptyline     Component      Latest Ref Rng & Units 12/15/2022 12/15/2022     1:55 PM  1:55 PM   Dopamine      0 - 20 pg/mL 163 (H)    Epinephrine      10 - 200 pg/mL 90    Norepinephrine      80 - 520 pg/mL 595 (H)    Catechol/Plasma Interp       See Note    Normetanephrine, Free      0.00 - 0.89 nmol/L  0.59   Metanephrine, Free      0.00 - 0.49 nmol/L  0.22   Metaneph/Plasma Interp        See Note     PAST MEDICAL HISTORY   Past Medical History:   Diagnosis Date    Anxiety     Asthma     Bipolar 1 disorder ([facility] [address]     Depression     Ganglion cyst     right    Migraine     Ovarian cyst 2013        PAST SURGICAL HISTORY   Past Surgical History:   Procedure Laterality Date    APPENDECTOMY  05/17/2017    laparoscopic    HAND SURGERY Right 12/28/2021    RIGHT WRIST GANGLION CYST EXCISION performed by Zeyad Russell MD at [place] [address] Right 12/28/2021    RIGHT DEQUERVAINS RELEASE performed by Blaine Hawkins MD at [address] [facility]   Tobacco:   reports that she has never smoked. She has never used smokeless tobacco.  Alcohol:   reports current alcohol use. Drugs:   reports no history of drug use. FAMILY HISTORY   No family history on file. ALLERGIES AND DRUG REACTIONS   Allergies   Allergen Reactions    Bee Venom     Iodine Hives     chloraprep        CURRENT MEDICATIONS   Current Outpatient Medications   Medication Sig Dispense Refill    JAIMIESS 0.15-0.03 &0.01 MG TABS Take 1 tablet by mouth daily      omeprazole (PRILOSEC) 20 MG delayed release capsule Take 1 capsule by mouth every morning (before breakfast) 90 capsule 1    metoprolol tartrate (LOPRESSOR) 25 MG tablet Take 0.5 tablets by mouth 2 times daily 90 tablet 1    fluticasone (FLONASE) 50 MCG/ACT nasal spray 2 sprays by Each Nostril route daily 48 g 1    loratadine (CLARITIN) 10 MG tablet TAKE ONE TABLET BY MOUTH DAILY 90 tablet 0    amitriptyline (ELAVIL) 25 MG tablet TAKE ONE TABLET BY MOUTH AT BEDTIME      albuterol sulfate HFA (PROVENTIL;VENTOLIN;PROAIR) 108 (90 Base) MCG/ACT inhaler Inhale 2 puffs into the lungs every 6 hours as needed for Wheezing       busPIRone (BUSPAR) 10 MG tablet Take 10 mg by mouth 2 times daily      montelukast (SINGULAIR) 10 MG tablet nightly       EPINEPHrine (EPIPEN 2-KRYSTA) 0.3 MG/0.3ML SOAJ injection Inject 0.3 mLs into the skin once as needed for up to 1 dose. Use as directed for allergic reaction 2 each 0     No current facility-administered medications for this visit. Review of Systems   Constitutional: No fever, no chills, no diaphoresis, no generalized weakness. HEENT: No blurred vision, No sore throat, no ear pain, no hair loss   Neck: denied any neck swelling, difficulty swallowing,   Cadrdiopulomary: No CP, SOB or palpitation, No orthopnea or PND. No cough or wheezing.    GI: No N/V/D, no constipation, No abdominal pain, no melena or hematochezia   : Denied any dysuria, hematuria, flank pain, discharge, or incontinence. Skin: denied any rash, striae ulcer, Hirsute, or hyperpigmentation. MSK: denied any joint deformity, joint pain/swelling, muscle pain, or back pain. Neuro: no numbess, no tingling, no weakness,     OBJECTIVE   /73   Pulse 86   Wt 262 lb (118.8 kg)   SpO2 99%   BMI 46.41 kg/m²    BP Readings from Last 4 Encounters:   02/13/23 105/73   01/05/23 108/86   12/15/22 112/78   11/22/22 120/82      Wt Readings from Last 6 Encounters:   02/13/23 262 lb (118.8 kg)   01/05/23 262 lb (118.8 kg)   12/15/22 269 lb (122 kg)   11/22/22 265 lb (120.2 kg)   11/10/22 257 lb (116.6 kg)   10/15/22 250 lb (113.4 kg)        Physical examination:   General: awake alert, oriented x3, no abnormal position or movements. HEENT: normocephalic non traumatic, no exophthalmos   Neck: supple, no LN enlargement, no thyromegaly, no thyroid tenderness, no JVD. Pulm: Clear equal air entry no added sounds, no wheezing or rhonchi   CVS: S1 + S2, no murmur, no heave. Dorsalis pedis pulse palpable   Abd: soft lax, no tenderness, no organomegaly, audible bowel sounds. Skin: warm, no lesions, no rash.  No striae, no bruising, no tags  Musculoskeletal: No back tenderness, no kyphosis/scoliosis   Neuro: CN intact, sensation normal, muscle power normal. No tremors   Psych: normal mood, and affect     Review of Laboratory Data:   I personally reviewed the following labs:    Lab Results   Component Value Date/Time    WBC 9.5 11/10/2022 12:00 PM    RBC 4.48 11/10/2022 12:00 PM    HGB 12.9 11/10/2022 12:00 PM    HCT 39.8 11/10/2022 12:00 PM    MCV 88.8 11/10/2022 12:00 PM    MCH 28.8 11/10/2022 12:00 PM    MCHC 32.4 11/10/2022 12:00 PM    RDW 13.9 11/10/2022 12:00 PM     11/10/2022 12:00 PM    MPV 10.2 11/10/2022 12:00 PM      Lab Results   Component Value Date/Time     11/10/2022 12:00 PM    K 4.5 11/10/2022 12:00 PM    K 4.1 10/15/2022 11:28 AM    CO2 21 (L) 11/10/2022 12:00 PM    BUN 10 11/10/2022 12:00 PM    CREATININE 0.7 11/10/2022 12:00 PM    CALCIUM 9.6 11/10/2022 12:00 PM    LABGLOM >60 11/10/2022 12:00 PM    GFRAA >60 10/15/2022 11:28 AM      Lab Results   Component Value Date/Time    TSH 1.720 11/10/2022 12:00 PM    T4FREE 1.29 11/10/2022 12:00 PM     Lab Results   Component Value Date/Time    LABA1C 5.3 07/18/2018 07:51 AM    GLUCOSE 93 11/10/2022 12:00 PM    LABCREA 163 01/13/2023 09:50 PM     Lab Results   Component Value Date/Time    TRIG 75 04/21/2022 02:00 PM    HDL 53 04/21/2022 02:00 PM    LDLCALC 81 04/21/2022 02:00 PM    CHOL 149 04/21/2022 02:00 PM      No results found for: ALDODIRENCAL   No results found for: ALPRRATIO   Lab Results   Component Value Date/Time    TRIG 75 04/21/2022 02:00 PM    HDL 53 04/21/2022 02:00 PM    LDLCALC 81 04/21/2022 02:00 PM    CHOL 149 04/21/2022 02:00 PM     No results found for: PTH   Lab Results   Component Value Date/Time    VITD25 44 04/21/2022 02:00 PM        ASSESSMENT & RECOMMENDATIONS   Shelbie November, a 22 y.o.-old female seen in for the following issues     Elevated metanephrines   Likely from being on Amitriptyline  Doubt pheochromocytoma     Discussed the importance of stopping Amitriptyline before screening for pheo   Improving symptoms and BP with beta-blocker also against Pheo     VitD deficiency   Continue vitD supplement     I personally reviewed external notes from PCP and other patient's care team providers, and personally interpreted labs associated with the above diagnosis. I also ordered labs to further assess and manage the above addressed medical conditions. Return in about 5 months (around 7/13/2023) for elevated metanephrines . The above issues were reviewed with the patient who understood and agreed with the plan. Thank you for allowing us to participate in the care of this patient. Please do not hesitate to contact us with any additional questions.     Diagnosis Orders   1. High catecholamines        2. Vitamin D deficiency          Kinga Gaffney MD   Endocrinologist, [facility] and Endocrinology   [address] [facility] [address]   Phone: [phone]   Fax: [phone]   --------------------------  An electronic signature was used to authenticate this note.  Naz Atkinson MD on 2/13/2023 at 8:11 AM

## 2023-02-14 ENCOUNTER — OFFICE VISIT (OUTPATIENT)
Dept: FAMILY MEDICINE CLINIC | Age: 23
End: 2023-02-14
Payer: MEDICAID

## 2023-02-14 VITALS
WEIGHT: 256 LBS | BODY MASS INDEX: 45.36 KG/M2 | TEMPERATURE: 97.8 F | RESPIRATION RATE: 18 BRPM | HEIGHT: 63 IN | HEART RATE: 87 BPM | OXYGEN SATURATION: 99 % | DIASTOLIC BLOOD PRESSURE: 70 MMHG | SYSTOLIC BLOOD PRESSURE: 116 MMHG

## 2023-02-14 DIAGNOSIS — K21.9 GASTROESOPHAGEAL REFLUX DISEASE, UNSPECIFIED WHETHER ESOPHAGITIS PRESENT: ICD-10-CM

## 2023-02-14 DIAGNOSIS — J30.2 SEASONAL ALLERGIES: ICD-10-CM

## 2023-02-14 DIAGNOSIS — F39 MOOD DISORDER (HCC): ICD-10-CM

## 2023-02-14 DIAGNOSIS — E66.01 MORBID OBESITY (HCC): ICD-10-CM

## 2023-02-14 DIAGNOSIS — I10 ESSENTIAL HYPERTENSION: Primary | ICD-10-CM

## 2023-02-14 DIAGNOSIS — R82.5 HIGH CATECHOLAMINES: ICD-10-CM

## 2023-02-14 PROCEDURE — 3078F DIAST BP <80 MM HG: CPT | Performed by: FAMILY MEDICINE

## 2023-02-14 PROCEDURE — G8417 CALC BMI ABV UP PARAM F/U: HCPCS | Performed by: FAMILY MEDICINE

## 2023-02-14 PROCEDURE — G8484 FLU IMMUNIZE NO ADMIN: HCPCS | Performed by: FAMILY MEDICINE

## 2023-02-14 PROCEDURE — 99214 OFFICE O/P EST MOD 30 MIN: CPT | Performed by: FAMILY MEDICINE

## 2023-02-14 PROCEDURE — G8427 DOCREV CUR MEDS BY ELIG CLIN: HCPCS | Performed by: FAMILY MEDICINE

## 2023-02-14 PROCEDURE — 1036F TOBACCO NON-USER: CPT | Performed by: FAMILY MEDICINE

## 2023-02-14 PROCEDURE — 3074F SYST BP LT 130 MM HG: CPT | Performed by: FAMILY MEDICINE

## 2023-02-14 RX ORDER — LORATADINE 10 MG/1
TABLET ORAL
Qty: 90 TABLET | Refills: 0 | Status: SHIPPED | OUTPATIENT
Start: 2023-02-14

## 2023-02-14 ASSESSMENT — LIFESTYLE VARIABLES
HOW MANY STANDARD DRINKS CONTAINING ALCOHOL DO YOU HAVE ON A TYPICAL DAY: PATIENT DOES NOT DRINK
HOW OFTEN DO YOU HAVE A DRINK CONTAINING ALCOHOL: NEVER

## 2023-02-14 ASSESSMENT — PATIENT HEALTH QUESTIONNAIRE - PHQ9
2. FEELING DOWN, DEPRESSED OR HOPELESS: 1
SUM OF ALL RESPONSES TO PHQ QUESTIONS 1-9: 2
1. LITTLE INTEREST OR PLEASURE IN DOING THINGS: 1
SUM OF ALL RESPONSES TO PHQ QUESTIONS 1-9: 2
SUM OF ALL RESPONSES TO PHQ9 QUESTIONS 1 & 2: 2

## 2023-02-14 NOTE — PROGRESS NOTES
[facility])  Family Medicine Outpatient        SUBJECTIVE:  CC: had concerns including Hypertension (No issues with HTN at this time. ). HPI:  Kerri Yao is a female 25 y.o. presented to the clinic for follow up after seeing Endocrinology yesterday. Elevated catecholamines suspected secondary to Amitriptyline. She will follow up with Endocrine in the next 6 months. As well, the patient reports doing [address] with her Mom. She is down approximately 15lb. Notes she is walking 7,500 + steps a day. Review of Systems   Constitutional:  Negative for appetite change, fatigue and fever. Respiratory:  Negative for cough, shortness of breath and wheezing. Cardiovascular:  Negative for chest pain and palpitations. Gastrointestinal:  Negative for abdominal pain, constipation, diarrhea, nausea and vomiting. Psychiatric/Behavioral:  Negative for self-injury and suicidal ideas.       Outpatient Medications Marked as Taking for the 2/14/23 encounter (Office Visit) with Valentin White MD   Medication Sig Dispense Refill    loratadine (CLARITIN) 10 MG tablet TAKE ONE TABLET BY MOUTH DAILY 90 tablet 0    JAIMIESS 0.15-0.03 &0.01 MG TABS Take 1 tablet by mouth daily      omeprazole (PRILOSEC) 20 MG delayed release capsule Take 1 capsule by mouth every morning (before breakfast) 90 capsule 1    metoprolol tartrate (LOPRESSOR) 25 MG tablet Take 0.5 tablets by mouth 2 times daily 90 tablet 1    fluticasone (FLONASE) 50 MCG/ACT nasal spray 2 sprays by Each Nostril route daily 48 g 1    amitriptyline (ELAVIL) 25 MG tablet TAKE ONE TABLET BY MOUTH AT BEDTIME      albuterol sulfate HFA (PROVENTIL;VENTOLIN;PROAIR) 108 (90 Base) MCG/ACT inhaler Inhale 2 puffs into the lungs every 6 hours as needed for Wheezing       busPIRone (BUSPAR) 10 MG tablet Take 10 mg by mouth 2 times daily      montelukast (SINGULAIR) 10 MG tablet nightly       EPINEPHrine (EPIPEN 2-KRYSTA) 0.3 MG/0.3ML SOAJ injection Inject 0.3 mLs into the skin once as needed for up to 1 dose. Use as directed for allergic reaction 2 each 0       I have reviewed all pertinent PMHx, PSHx, FamHx, SocialHx, medications, and allergies and updated history as appropriate. OBJECTIVE    VS: /70   Pulse 87   Temp 97.8 °F (36.6 °C)   Resp 18   Ht 5' 3\" (1.6 m)   Wt 256 lb (116.1 kg)   SpO2 99%   BMI 45.35 kg/m²   Physical Exam  Constitutional:       General: She is not in acute distress. Appearance: She is well-developed. She is obese. She is not diaphoretic. HENT:      Head: Normocephalic and atraumatic. Eyes:      Conjunctiva/sclera: Conjunctivae normal.      Pupils: Pupils are equal, round, and reactive to light. Cardiovascular:      Rate and Rhythm: Normal rate and regular rhythm. Pulmonary:      Effort: Pulmonary effort is normal.      Breath sounds: Normal breath sounds. Abdominal:      General: Bowel sounds are normal. There is no distension. Palpations: Abdomen is soft. Tenderness: There is no abdominal tenderness. Hernia: No hernia is present. Musculoskeletal:      Cervical back: Normal range of motion and neck supple. Skin:     General: Skin is warm and dry. Neurological:      Mental Status: She is alert and oriented to person, place, and time. ASSESSMENT/PLAN:  1. Essential hypertension  Stable. Continue current regimen. 2. Morbid obesity ([address]  Improving. Continue on [employer] and with exercise regimen at this time. 3. Seasonal allergies  - loratadine (CLARITIN) 10 MG tablet; TAKE ONE TABLET BY MOUTH DAILY  Dispense: 90 tablet; Refill: 0    4. Mood disorder ([address]    5. Gastroesophageal reflux disease, unspecified whether esophagitis present    6. High catecholamines  Established with Endocrinology yesterday. Suspected medication related.  Continue to follow with specialist.     I have reviewed my findings and recommendations with Juli Mahoney MD  2/27/2023 11:32 AM  Return in about 3 months (around 5/14/2023) for established f/u. Counseled regarding above diagnosis, including possible risks and complications, especially if left uncontrolled. Patient counseled on red flag symptoms and if they occur to go to the ED. Discussed medications risk/benefits and possible side effects and alternatives to treatment. Patient and/or guardian verbalizes understanding, agrees, feels comfortable with and wishes to proceed with above treatment plan. Advised patient regarding importance of keeping up with recommended health maintenance and to schedule as soon as possible if overdue, as this is important in assessing for undiagnosed pathology, especially cancer, as well as protecting against potentially harmful/life threatening disease. Patient and/or guardian verbalizes understanding and agrees with above counseling, assessment and plan. All questions answered. Please note this report has been partially produced using speech recognition software  and may contain errors related to that system including grammar, punctuation and spelling as well as words and phrases that may seem inappropriate. If there are questions or concerns please feel free to contact me to clarify.

## 2023-02-19 PROBLEM — R82.5 HIGH CATECHOLAMINES: Status: ACTIVE | Noted: 2023-02-19

## 2023-02-19 PROBLEM — E55.9 VITAMIN D DEFICIENCY: Status: ACTIVE | Noted: 2023-02-19

## 2023-02-27 ASSESSMENT — ENCOUNTER SYMPTOMS
SHORTNESS OF BREATH: 0
ABDOMINAL PAIN: 0
DIARRHEA: 0
VOMITING: 0
COUGH: 0
NAUSEA: 0
CONSTIPATION: 0
WHEEZING: 0

## 2023-03-30 DIAGNOSIS — J30.2 SEASONAL ALLERGIES: ICD-10-CM

## 2023-03-30 NOTE — TELEPHONE ENCOUNTER
Last seen 2/14/2023  Next appt 5/16/2023    Electronically signed by Chastity Loyd LPN on 3/80/75 at 8:82 PM EDT

## 2023-03-31 RX ORDER — FLUTICASONE PROPIONATE 50 MCG
2 SPRAY, SUSPENSION (ML) NASAL DAILY
Qty: 48 G | Refills: 1 | Status: SHIPPED | OUTPATIENT
Start: 2023-03-31

## 2023-04-17 ENCOUNTER — APPOINTMENT (OUTPATIENT)
Dept: GENERAL RADIOLOGY | Age: 23
End: 2023-04-17
Payer: MEDICAID

## 2023-04-17 ENCOUNTER — HOSPITAL ENCOUNTER (EMERGENCY)
Age: 23
Discharge: HOME | End: 2023-04-17
Payer: MEDICAID

## 2023-04-17 VITALS
OXYGEN SATURATION: 100 % | HEART RATE: 92 BPM | HEIGHT: 63 IN | RESPIRATION RATE: 16 BRPM | BODY MASS INDEX: 41.46 KG/M2 | TEMPERATURE: 98.2 F | WEIGHT: 234 LBS | DIASTOLIC BLOOD PRESSURE: 84 MMHG | SYSTOLIC BLOOD PRESSURE: 130 MMHG

## 2023-04-17 DIAGNOSIS — S90.32XA CONTUSION OF LEFT FOOT, INITIAL ENCOUNTER: Primary | ICD-10-CM

## 2023-04-17 DIAGNOSIS — S91.312A LACERATION OF LEFT FOOT, INITIAL ENCOUNTER: ICD-10-CM

## 2023-04-17 PROCEDURE — 99211 OFF/OP EST MAY X REQ PHY/QHP: CPT

## 2023-04-17 PROCEDURE — 73630 X-RAY EXAM OF FOOT: CPT

## 2023-04-17 RX ORDER — BACITRACIN ZINC AND POLYMYXIN B SULFATE 500; 1000 [USP'U]/G; [USP'U]/G
OINTMENT TOPICAL
Qty: 15 G | Refills: 0 | Status: SHIPPED | OUTPATIENT
Start: 2023-04-17 | End: 2023-04-24

## 2023-04-17 ASSESSMENT — PAIN - FUNCTIONAL ASSESSMENT: PAIN_FUNCTIONAL_ASSESSMENT: 0-10

## 2023-04-17 ASSESSMENT — PAIN DESCRIPTION - PAIN TYPE: TYPE: ACUTE PAIN

## 2023-04-17 ASSESSMENT — PAIN DESCRIPTION - ORIENTATION: ORIENTATION: LEFT

## 2023-04-17 ASSESSMENT — PAIN DESCRIPTION - LOCATION: LOCATION: TOE (COMMENT WHICH ONE)

## 2023-04-17 ASSESSMENT — PAIN SCALES - GENERAL: PAINLEVEL_OUTOF10: 5

## 2023-04-17 NOTE — ED PROVIDER NOTES
HPI:  4/17/23, Time: 10:38 AM EDT         Althea Patel is a 25 y.o. female presenting to the ED for left 4-5th toe pain, beginning 4 days ago after accidentally kicking a wall. The complaint has been persistent, moderate in severity, and worsened by movement of 4-5th toes, walking. Patient reports that she also noticed a small cut to the fourth and fifth interdigital space of the left foot. Symptoms are persistent therefore prompting patient to come for evaluation to ensure there is no fracture to this area. No numbness or tingling to the left foot. Afebrile without recent travel or sick contacts. No prior injuries to this area. Patient denies all other symptoms at this time. Review of Systems:   A complete review of systems was performed and pertinent positives and negatives are stated within HPI, all other systems reviewed and are negative.          --------------------------------------------- PAST HISTORY ---------------------------------------------  Past Medical History:  has a past medical history of Anxiety, Asthma, Bipolar 1 disorder ([facility] [address] Depression, Ganglion cyst, Migraine, and Ovarian cyst.    Past Surgical History:  has a past surgical history that includes Appendectomy (05/17/2017); [place] tooth extraction; Wrist surgery (Right, 12/28/2021); and Hand surgery (Right, 12/28/2021). Social History:  reports that she has never smoked. She has never used smokeless tobacco. She reports current alcohol use. She reports that she does not use drugs. Family History: family history is not on file. The patients home medications have been reviewed. Allergies: Bee venom and Iodine    -------------------------------------------------- RESULTS -------------------------------------------------  All laboratory and radiology results have been personally reviewed by myself   LABS:  No results found for this visit on 04/17/23.     RADIOLOGY:  Interpreted by Radiologist.  XR FOOT LEFT (MIN 3

## 2023-06-03 DIAGNOSIS — J30.2 SEASONAL ALLERGIES: ICD-10-CM

## 2023-06-05 RX ORDER — LORATADINE 10 MG/1
TABLET ORAL
Qty: 90 TABLET | Refills: 0 | Status: SHIPPED | OUTPATIENT
Start: 2023-06-05

## 2023-06-05 NOTE — TELEPHONE ENCOUNTER
Last Appointment:  2/14/2023  Future Appointments   Date Time Provider Paulette Julio   6/15/2023  3:00 PM Godwin Loyd MD [facility]

## 2023-06-10 ENCOUNTER — HOSPITAL ENCOUNTER (EMERGENCY)
Age: 23
Discharge: HOME | End: 2023-06-10
Payer: MEDICAID

## 2023-06-10 VITALS
OXYGEN SATURATION: 100 % | TEMPERATURE: 98.6 F | DIASTOLIC BLOOD PRESSURE: 90 MMHG | WEIGHT: 230 LBS | HEART RATE: 117 BPM | RESPIRATION RATE: 20 BRPM | BODY MASS INDEX: 40.75 KG/M2 | SYSTOLIC BLOOD PRESSURE: 137 MMHG | HEIGHT: 63 IN

## 2023-06-10 DIAGNOSIS — R11.2 NAUSEA AND VOMITING, UNSPECIFIED VOMITING TYPE: Primary | ICD-10-CM

## 2023-06-10 DIAGNOSIS — M54.9 BACK PAIN, UNSPECIFIED BACK LOCATION, UNSPECIFIED BACK PAIN LATERALITY, UNSPECIFIED CHRONICITY: ICD-10-CM

## 2023-06-10 DIAGNOSIS — N30.01 ACUTE CYSTITIS WITH HEMATURIA: ICD-10-CM

## 2023-06-10 LAB
BACTERIA URNS QL MICRO: ABNORMAL /HPF
BILIRUB UR QL STRIP: NEGATIVE
CLARITY UR: CLEAR
COLOR UR: YELLOW
EPI CELLS #/AREA URNS HPF: ABNORMAL /HPF
GLUCOSE UR STRIP-MCNC: NEGATIVE MG/DL
HCG UR QL: NEGATIVE
HGB UR QL STRIP: ABNORMAL
KETONES UR STRIP-MCNC: NEGATIVE MG/DL
LEUKOCYTE ESTERASE UR QL STRIP: NEGATIVE
NITRITE UR QL STRIP: NEGATIVE
PH UR STRIP: 6.5 [PH] (ref 5–9)
PROT UR STRIP-MCNC: NEGATIVE MG/DL
RBC #/AREA URNS HPF: ABNORMAL /HPF (ref 0–2)
SP GR UR STRIP: 1.01 (ref 1–1.03)
UROBILINOGEN UR STRIP-ACNC: 0.2 E.U./DL
WBC #/AREA URNS HPF: ABNORMAL /HPF (ref 0–5)

## 2023-06-10 PROCEDURE — 81025 URINE PREGNANCY TEST: CPT

## 2023-06-10 PROCEDURE — 81001 URINALYSIS AUTO W/SCOPE: CPT

## 2023-06-10 PROCEDURE — 87088 URINE BACTERIA CULTURE: CPT

## 2023-06-10 PROCEDURE — 99211 OFF/OP EST MAY X REQ PHY/QHP: CPT

## 2023-06-10 RX ORDER — CEFDINIR 300 MG/1
300 CAPSULE ORAL 2 TIMES DAILY
Qty: 14 CAPSULE | Refills: 0 | Status: SHIPPED | OUTPATIENT
Start: 2023-06-10 | End: 2023-06-17

## 2023-06-10 RX ORDER — TIZANIDINE 4 MG/1
4 TABLET ORAL 2 TIMES DAILY PRN
Qty: 12 TABLET | Refills: 0 | Status: SHIPPED | OUTPATIENT
Start: 2023-06-10

## 2023-06-10 RX ORDER — ONDANSETRON 4 MG/1
4 TABLET, FILM COATED ORAL EVERY 8 HOURS PRN
Qty: 12 TABLET | Refills: 0 | Status: SHIPPED | OUTPATIENT
Start: 2023-06-10 | End: 2023-06-15

## 2023-06-10 ASSESSMENT — PAIN - FUNCTIONAL ASSESSMENT: PAIN_FUNCTIONAL_ASSESSMENT: 0-10

## 2023-06-10 ASSESSMENT — PAIN SCALES - GENERAL: PAINLEVEL_OUTOF10: 10

## 2023-06-10 ASSESSMENT — PAIN DESCRIPTION - DESCRIPTORS: DESCRIPTORS: ACHING;DISCOMFORT

## 2023-06-10 ASSESSMENT — PAIN DESCRIPTION - ORIENTATION: ORIENTATION: LOWER

## 2023-06-10 ASSESSMENT — PAIN DESCRIPTION - LOCATION: LOCATION: BACK

## 2023-06-10 NOTE — ED PROVIDER NOTES
dictation. Interpretation per the Radiologist below, if available at the time of this note:    No orders to display     No results found. No results found.     -------------------------------------------------PROCEDURES--------------------------------------------  Unless otherwise noted below, none      Procedures      ---EMERGENCY DEPARTMENT COURSE and DIFFERENTIAL DIAGNOSIS/MDM---  (CC/HPI Summary, DDx, ED Course, and Reassessment:) (Disposition Considerations (include 1 Tests not done, Admit vs D/C, Shared Decision Making, Pt Expectation of Test or Tx., Consults, Social Determinants, Chronic Conditiions, Records reviewed)    MDM  Number of Diagnoses or Management Options  Acute cystitis with hematuria  Back pain, unspecified back location, unspecified back pain laterality, unspecified chronicity  Nausea and vomiting, unspecified vomiting type  Diagnosis management comments: No acute distress. Urinalysis was reviewed. There is a possibility she might have a developing urinary tract infection. We will get a urine culture I will place her on antibiotic. Placed on Zofran for nausea in case. Patient does state pain is worse with movement in her back. Family does state that she was doing a lot of lifting recently and thinks she might of strained her back. I will place her on a antispasm medication and see if that helps her. Continue taking ibuprofen. If worse go to the ER.          DISCHARGE MEDICATIONS:  Discharge Medication List as of 6/10/2023  4:55 PM        START taking these medications    Details   ondansetron (ZOFRAN) 4 MG tablet Take 1 tablet by mouth every 8 hours as needed for Nausea or Vomiting, Disp-12 tablet, R-0Normal      cefdinir (OMNICEF) 300 MG capsule Take 1 capsule by mouth 2 times daily for 7 days, Disp-14 capsule, R-0Normal      tiZANidine (ZANAFLEX) 4 MG tablet Take 1 tablet by mouth 2 times daily as needed (pain/spasm (may cause drowsiness)), Disp-12 tablet, R-0Normal

## 2023-06-15 ENCOUNTER — OFFICE VISIT (OUTPATIENT)
Dept: FAMILY MEDICINE CLINIC | Age: 23
End: 2023-06-15
Payer: MEDICAID

## 2023-06-15 VITALS
SYSTOLIC BLOOD PRESSURE: 118 MMHG | DIASTOLIC BLOOD PRESSURE: 80 MMHG | TEMPERATURE: 98.4 F | HEIGHT: 63 IN | BODY MASS INDEX: 42.21 KG/M2 | OXYGEN SATURATION: 97 % | RESPIRATION RATE: 18 BRPM | WEIGHT: 238.2 LBS | HEART RATE: 107 BPM

## 2023-06-15 DIAGNOSIS — E66.01 MORBID OBESITY (HCC): ICD-10-CM

## 2023-06-15 DIAGNOSIS — N30.00 ACUTE CYSTITIS WITHOUT HEMATURIA: ICD-10-CM

## 2023-06-15 DIAGNOSIS — M54.50 LUMBAR BACK PAIN: Primary | ICD-10-CM

## 2023-06-15 LAB
CONTROL: NORMAL
PREGNANCY TEST URINE, POC: NEGATIVE

## 2023-06-15 PROCEDURE — 99214 OFFICE O/P EST MOD 30 MIN: CPT | Performed by: FAMILY MEDICINE

## 2023-06-15 PROCEDURE — G8427 DOCREV CUR MEDS BY ELIG CLIN: HCPCS | Performed by: FAMILY MEDICINE

## 2023-06-15 PROCEDURE — 3078F DIAST BP <80 MM HG: CPT | Performed by: FAMILY MEDICINE

## 2023-06-15 PROCEDURE — 3074F SYST BP LT 130 MM HG: CPT | Performed by: FAMILY MEDICINE

## 2023-06-15 PROCEDURE — 1036F TOBACCO NON-USER: CPT | Performed by: FAMILY MEDICINE

## 2023-06-15 PROCEDURE — G8417 CALC BMI ABV UP PARAM F/U: HCPCS | Performed by: FAMILY MEDICINE

## 2023-06-15 PROCEDURE — 81025 URINE PREGNANCY TEST: CPT | Performed by: FAMILY MEDICINE

## 2023-06-15 RX ORDER — METHYLPREDNISOLONE 4 MG/1
TABLET ORAL
Qty: 1 KIT | Refills: 0 | Status: SHIPPED | OUTPATIENT
Start: 2023-06-15 | End: 2023-06-21

## 2023-06-16 ENCOUNTER — HOSPITAL ENCOUNTER (OUTPATIENT)
Age: 23
Discharge: HOME | End: 2023-06-18
Payer: MEDICAID

## 2023-06-16 ENCOUNTER — HOSPITAL ENCOUNTER (OUTPATIENT)
Dept: GENERAL RADIOLOGY | Age: 23
Discharge: HOME | End: 2023-06-18
Payer: MEDICAID

## 2023-06-16 DIAGNOSIS — M54.50 LUMBAR BACK PAIN: ICD-10-CM

## 2023-06-16 PROCEDURE — 72100 X-RAY EXAM L-S SPINE 2/3 VWS: CPT

## 2023-06-27 ASSESSMENT — ENCOUNTER SYMPTOMS
ABDOMINAL PAIN: 0
NAUSEA: 0
SHORTNESS OF BREATH: 0
DIARRHEA: 0
BLOOD IN STOOL: 0
BACK PAIN: 1
COUGH: 0
CONSTIPATION: 0
VOMITING: 0
WHEEZING: 0

## 2023-07-18 ENCOUNTER — TELEPHONE (OUTPATIENT)
Dept: FAMILY MEDICINE CLINIC | Age: 23
End: 2023-07-18

## 2023-07-18 DIAGNOSIS — Z87.440 HISTORY OF UTI: Primary | ICD-10-CM

## 2023-07-18 NOTE — TELEPHONE ENCOUNTER
Spoke with patient and she is still having a lot of low back pain.  She would like to have her WBC checked as well as urine testing to confirm that her infection is gone

## 2023-07-18 NOTE — TELEPHONE ENCOUNTER
----- Message from Irma Jimenez sent at 7/18/2023 10:27 AM EDT -----  Subject: Referral Request    Reason for referral request? patient would like another lab order to make   sure the infection she had passed before she goes to PT  Provider patient wants to be referred to(if known):     Provider Phone Number(if known):     Additional Information for Provider?   ---------------------------------------------------------------------------  --------------  Kenton Bowerston Lalo    [phone]; OK to leave message on voicemail  ---------------------------------------------------------------------------  --------------

## 2023-07-20 NOTE — TELEPHONE ENCOUNTER
Spoke with patient and advised.  She states that she has not taken any Zanaflex lately, she will get the Urine culture and will go to UC/Walk-In if anything worsens

## 2023-07-20 NOTE — TELEPHONE ENCOUNTER
Lumbar xray was normal. Patient previously declined PT. Is muscle relaxer helping, Zanaflex. Recommend at home lumbar exercises. Ucx placed.  If any flank pain, f/c needs to go to UC/Walk-in for same day eval.

## 2023-07-25 ENCOUNTER — HOSPITAL ENCOUNTER (OUTPATIENT)
Age: 23
Discharge: HOME | End: 2023-07-25
Payer: MEDICAID

## 2023-07-25 DIAGNOSIS — Z87.440 HISTORY OF UTI: ICD-10-CM

## 2023-07-25 PROCEDURE — 87086 URINE CULTURE/COLONY COUNT: CPT

## 2023-07-25 PROCEDURE — 36415 COLL VENOUS BLD VENIPUNCTURE: CPT

## 2023-07-26 LAB
MICROORGANISM SPEC CULT: ABNORMAL
MICROORGANISM SPEC CULT: ABNORMAL
SPECIMEN DESCRIPTION: ABNORMAL

## 2023-07-28 ENCOUNTER — TELEPHONE (OUTPATIENT)
Dept: FAMILY MEDICINE CLINIC | Age: 23
End: 2023-07-28

## 2023-07-28 NOTE — TELEPHONE ENCOUNTER
Patient would like a referral to Dr. Bean Murillo for her right wrist. States she has a recurrent cyst. He has treated in the past. Needs new referral.   Last seen 6/15/2023  Next appt Visit date not found

## 2023-08-01 ENCOUNTER — APPOINTMENT (OUTPATIENT)
Dept: ULTRASOUND IMAGING | Age: 23
End: 2023-08-01
Payer: MEDICAID

## 2023-08-01 ENCOUNTER — TELEMEDICINE (OUTPATIENT)
Dept: FAMILY MEDICINE CLINIC | Age: 23
End: 2023-08-01
Payer: MEDICAID

## 2023-08-01 ENCOUNTER — HOSPITAL ENCOUNTER (EMERGENCY)
Age: 23
Discharge: HOME | End: 2023-08-01
Attending: EMERGENCY MEDICINE
Payer: MEDICAID

## 2023-08-01 VITALS
RESPIRATION RATE: 18 BRPM | SYSTOLIC BLOOD PRESSURE: 123 MMHG | DIASTOLIC BLOOD PRESSURE: 76 MMHG | HEART RATE: 76 BPM | WEIGHT: 238 LBS | TEMPERATURE: 97.7 F | BODY MASS INDEX: 42.17 KG/M2 | OXYGEN SATURATION: 98 % | HEIGHT: 63 IN

## 2023-08-01 DIAGNOSIS — R10.9 ABDOMINAL PAIN, UNSPECIFIED ABDOMINAL LOCATION: Primary | ICD-10-CM

## 2023-08-01 DIAGNOSIS — N76.0 BACTERIAL VAGINOSIS: Primary | ICD-10-CM

## 2023-08-01 DIAGNOSIS — N93.9 VAGINAL BLEEDING: ICD-10-CM

## 2023-08-01 DIAGNOSIS — B96.89 BACTERIAL VAGINOSIS: Primary | ICD-10-CM

## 2023-08-01 DIAGNOSIS — R10.32 ABDOMINAL PAIN, LEFT LOWER QUADRANT: ICD-10-CM

## 2023-08-01 DIAGNOSIS — M54.9 BACK PAIN, UNSPECIFIED BACK LOCATION, UNSPECIFIED BACK PAIN LATERALITY, UNSPECIFIED CHRONICITY: ICD-10-CM

## 2023-08-01 DIAGNOSIS — M65.4 DE QUERVAIN'S DISEASE (TENOSYNOVITIS): ICD-10-CM

## 2023-08-01 DIAGNOSIS — N93.9 ABNORMAL UTERINE BLEEDING (AUB): ICD-10-CM

## 2023-08-01 LAB
ALBUMIN SERPL-MCNC: 4.4 G/DL (ref 3.5–5.2)
ALP SERPL-CCNC: 92 U/L (ref 35–104)
ALT SERPL-CCNC: 19 U/L (ref 0–32)
ANION GAP SERPL CALCULATED.3IONS-SCNC: 9 MMOL/L (ref 7–16)
AST SERPL-CCNC: 31 U/L (ref 0–31)
BACTERIA URNS QL MICRO: ABNORMAL
BASOPHILS # BLD: 0.03 K/UL (ref 0–0.2)
BASOPHILS NFR BLD: 0 % (ref 0–2)
BILIRUB SERPL-MCNC: 0.2 MG/DL (ref 0–1.2)
BILIRUB UR QL STRIP: NEGATIVE
BUN SERPL-MCNC: 13 MG/DL (ref 6–20)
CALCIUM SERPL-MCNC: 9.6 MG/DL (ref 8.6–10.2)
CHLORIDE SERPL-SCNC: 105 MMOL/L (ref 98–107)
CLARITY UR: CLEAR
CLUE CELLS VAG QL WET PREP: ABNORMAL
CO2 SERPL-SCNC: 24 MMOL/L (ref 22–29)
COLOR UR: YELLOW
CREAT SERPL-MCNC: 0.7 MG/DL (ref 0.5–1)
EOSINOPHIL # BLD: 0.09 K/UL (ref 0.05–0.5)
EOSINOPHILS RELATIVE PERCENT: 1 % (ref 0–6)
EPI CELLS #/AREA URNS HPF: ABNORMAL /HPF
ERYTHROCYTE [DISTWIDTH] IN BLOOD BY AUTOMATED COUNT: 12.6 % (ref 11.5–15)
GFR SERPL CREATININE-BSD FRML MDRD: >60 ML/MIN/1.73M2
GLUCOSE SERPL-MCNC: 84 MG/DL (ref 74–99)
GLUCOSE UR STRIP-MCNC: NEGATIVE MG/DL
HCG, URINE, POC: NEGATIVE
HCT VFR BLD AUTO: 42.5 % (ref 34–48)
HGB BLD-MCNC: 14 G/DL (ref 11.5–15.5)
HGB UR QL STRIP.AUTO: ABNORMAL
IMM GRANULOCYTES # BLD AUTO: <0.03 K/UL (ref 0–0.58)
IMM GRANULOCYTES NFR BLD: 0 % (ref 0–5)
KETONES UR STRIP-MCNC: NEGATIVE MG/DL
LEUKOCYTE ESTERASE UR QL STRIP: NEGATIVE
LYMPHOCYTES NFR BLD: 2.74 K/UL (ref 1.5–4)
LYMPHOCYTES RELATIVE PERCENT: 31 % (ref 20–42)
Lab: NORMAL
MCH RBC QN AUTO: 29.6 PG (ref 26–35)
MCHC RBC AUTO-ENTMCNC: 32.9 G/DL (ref 32–34.5)
MCV RBC AUTO: 89.9 FL (ref 80–99.9)
MONOCYTES NFR BLD: 0.34 K/UL (ref 0.1–0.95)
MONOCYTES NFR BLD: 4 % (ref 2–12)
NEGATIVE QC PASS/FAIL: NORMAL
NEUTROPHILS NFR BLD: 64 % (ref 43–80)
NEUTS SEG NFR BLD: 5.58 K/UL (ref 1.8–7.3)
NITRITE UR QL STRIP: NEGATIVE
PH UR STRIP: 5.5 [PH] (ref 5–9)
PLATELET # BLD AUTO: 320 K/UL (ref 130–450)
PMV BLD AUTO: 10.6 FL (ref 7–12)
POSITIVE QC PASS/FAIL: NORMAL
POTASSIUM SERPL-SCNC: 4.4 MMOL/L (ref 3.5–5)
POTASSIUM SERPL-SCNC: 5.6 MMOL/L (ref 3.5–5)
PROT SERPL-MCNC: 8.1 G/DL (ref 6.4–8.3)
PROT UR STRIP-MCNC: NEGATIVE MG/DL
RBC # BLD AUTO: 4.73 M/UL (ref 3.5–5.5)
RBC #/AREA URNS HPF: ABNORMAL /HPF
SODIUM SERPL-SCNC: 138 MMOL/L (ref 132–146)
SOURCE WET PREP: ABNORMAL
SP GR UR STRIP: 1.01 (ref 1–1.03)
T VAGINALIS VAG QL WET PREP: ABNORMAL
UROBILINOGEN UR STRIP-ACNC: 0.2 EU/DL (ref 0–1)
WBC #/AREA URNS HPF: ABNORMAL /HPF
WBC OTHER # BLD: 8.8 K/UL (ref 4.5–11.5)
YEAST WET PREP: ABNORMAL

## 2023-08-01 PROCEDURE — G8417 CALC BMI ABV UP PARAM F/U: HCPCS | Performed by: FAMILY MEDICINE

## 2023-08-01 PROCEDURE — G8427 DOCREV CUR MEDS BY ELIG CLIN: HCPCS | Performed by: FAMILY MEDICINE

## 2023-08-01 PROCEDURE — 87210 SMEAR WET MOUNT SALINE/INK: CPT

## 2023-08-01 PROCEDURE — 87591 N.GONORRHOEAE DNA AMP PROB: CPT

## 2023-08-01 PROCEDURE — 1036F TOBACCO NON-USER: CPT | Performed by: FAMILY MEDICINE

## 2023-08-01 PROCEDURE — 84132 ASSAY OF SERUM POTASSIUM: CPT

## 2023-08-01 PROCEDURE — 85025 COMPLETE CBC W/AUTO DIFF WBC: CPT

## 2023-08-01 PROCEDURE — 87491 CHLMYD TRACH DNA AMP PROBE: CPT

## 2023-08-01 PROCEDURE — 76856 US EXAM PELVIC COMPLETE: CPT

## 2023-08-01 PROCEDURE — 81001 URINALYSIS AUTO W/SCOPE: CPT

## 2023-08-01 PROCEDURE — 36415 COLL VENOUS BLD VENIPUNCTURE: CPT

## 2023-08-01 PROCEDURE — 80053 COMPREHEN METABOLIC PANEL: CPT

## 2023-08-01 PROCEDURE — 99215 OFFICE O/P EST HI 40 MIN: CPT | Performed by: FAMILY MEDICINE

## 2023-08-01 PROCEDURE — 76830 TRANSVAGINAL US NON-OB: CPT

## 2023-08-01 PROCEDURE — 99284 EMERGENCY DEPT VISIT MOD MDM: CPT

## 2023-08-01 RX ORDER — METRONIDAZOLE 500 MG/1
500 TABLET ORAL 2 TIMES DAILY
Qty: 14 TABLET | Refills: 0 | Status: SHIPPED | OUTPATIENT
Start: 2023-08-01 | End: 2023-08-08

## 2023-08-01 ASSESSMENT — PAIN SCALES - GENERAL: PAINLEVEL_OUTOF10: 5

## 2023-08-01 ASSESSMENT — ENCOUNTER SYMPTOMS
NAUSEA: 0
VOMITING: 0
BACK PAIN: 1
CONSTIPATION: 0
ABDOMINAL PAIN: 1
COUGH: 0
SHORTNESS OF BREATH: 0
WHEEZING: 0
DIARRHEA: 0

## 2023-08-01 ASSESSMENT — PAIN - FUNCTIONAL ASSESSMENT: PAIN_FUNCTIONAL_ASSESSMENT: 0-10

## 2023-08-01 NOTE — PROGRESS NOTES
TeleMedicine Video Visit    Hari Najera, was evaluated through a synchronous (real-time) audio-video encounter. The patient (or guardian if applicable) is aware that this is a billable service. Verbal consent to proceed has been obtained within the past 12 months. The visit was conducted pursuant to the emergency declaration under the [address] and the [employer] General Act. Patient identification was verified, and a caregiver was present when appropriate. The patient was located in a state where the provider was credentialed to provide care. Patient identification was verified at the start of the visit, including the patient's telephone number and physical location. I discussed with the patient the nature of our telehealth visits, that:     Due to the nature of an audio- video modality, the only components of a physical exam that could be done are the elements supported by direct observation. I would evaluate the patient and recommend diagnostics and treatments based on my assessment. If it was felt that the patient should be evaluated in clinic or an emergency room setting, then they would be directed there. Our sessions are not being recorded and that personal health information is protected. Our team would provide follow up care in person if/when the patient needs it. Patient's location: home address in [place]. Physician  location other address in [address] [place] [address] other people involved in call none    Not billed by time    This visit was completed virtually using [employer] was evaluated through a synchronous (real-time) audio-video encounter. The patient (or guardian if applicable) is aware that this is a billable service, which includes applicable co-pays. This Virtual Visit was conducted with patient's (and/or legal guardian's) consent.  Patient identification was verified, and a

## 2023-08-01 NOTE — DISCHARGE INSTRUCTIONS
Return to the emergency department if you have any worsening or concerning symptoms such as intractable pain, vomiting, fever. Follow-up with your OB/GYN and primary care provider if needed.

## 2023-08-03 LAB
C TRACH DNA SPEC QL PROBE+SIG AMP: NEGATIVE
N GONORRHOEA DNA SPEC QL PROBE+SIG AMP: NEGATIVE
SPECIMEN DESCRIPTION: NORMAL

## 2023-08-10 ENCOUNTER — TELEPHONE (OUTPATIENT)
Dept: ORTHOPEDIC SURGERY | Age: 23
End: 2023-08-10

## 2023-08-10 NOTE — TELEPHONE ENCOUNTER
Left voice mail for pt, Will send medical records to hand surgeon due to physician no accommodating diagnosis. Advised to give office a call.

## 2023-08-14 ENCOUNTER — TELEPHONE (OUTPATIENT)
Dept: FAMILY MEDICINE CLINIC | Age: 23
End: 2023-08-14

## 2023-08-14 NOTE — TELEPHONE ENCOUNTER
Patient called and informed she is still having abdominal pain after ED visit 8/1/23 - patient informed that that ED did pelvic exam and ultrasounds but did not do CT. ED note states patient also experienced vaginal bleeding with left lower quadrant abdominal pain during visit. Patient is concerned she has kidney stones and requests an order for CT. Please advise.

## 2023-08-14 NOTE — TELEPHONE ENCOUNTER
Patient says Rhona Alcala was previously rx by physician at [facility] for childhood asthma/allergies, who had also prescribed Albuterol inhaler. Patient says she's been having extremely bad allergy symptoms even while using the Fluticasone (Dr. Khurram Hector) and Claritin rx by you as prescribed - notably more labored breathing and more frequent headaches. She last reports taking Singulair about two months ago and said she normally takes the two medications prescribed by our office in the morning and the Singulair at night. Please advise.

## 2023-08-14 NOTE — TELEPHONE ENCOUNTER
Called and informed patient as noted by Dr. Ora Lopez. Patient informed she is frustrated due to having been dealing with this issue since July. I advised patient to call ofc after she goes for same day/UC visit so we can speak with Dr. Ora Lopez for advisement if tests are not ordered through attending physician. Patient verbalized understanding.

## 2023-08-14 NOTE — TELEPHONE ENCOUNTER
Patient called and requested refills    Last seen 8/1/2023  Next appt Visit date not found    [facility]/Mike

## 2023-08-14 NOTE — TELEPHONE ENCOUNTER
Per Dr. Thomas Finger -    Patient would need to be seen for a same day or go to [facility] for acute evaluation. She was in the ED almost 2 weeks ago. Needs reevaluated.

## 2023-08-14 NOTE — TELEPHONE ENCOUNTER
Unsure who rx med - marked as patient reported in medication list. Attempted to call patient to clarify, no answer. Will also send [employer].

## 2023-08-15 RX ORDER — MONTELUKAST SODIUM 10 MG/1
10 TABLET ORAL NIGHTLY
Qty: 30 TABLET | Refills: 0 | Status: SHIPPED | OUTPATIENT
Start: 2023-08-15

## 2023-08-18 DIAGNOSIS — J30.2 SEASONAL ALLERGIES: ICD-10-CM

## 2023-08-21 RX ORDER — FLUTICASONE PROPIONATE 50 MCG
2 SPRAY, SUSPENSION (ML) NASAL DAILY
Qty: 48 G | Refills: 1 | Status: SHIPPED | OUTPATIENT
Start: 2023-08-21

## 2023-08-22 DIAGNOSIS — K21.9 GASTROESOPHAGEAL REFLUX DISEASE WITHOUT ESOPHAGITIS: ICD-10-CM

## 2023-08-22 RX ORDER — OMEPRAZOLE 20 MG/1
20 CAPSULE, DELAYED RELEASE ORAL
Qty: 90 CAPSULE | Refills: 1 | OUTPATIENT
Start: 2023-08-22 | End: 2024-02-18

## 2023-08-29 ENCOUNTER — TELEMEDICINE (OUTPATIENT)
Dept: FAMILY MEDICINE CLINIC | Age: 23
End: 2023-08-29

## 2023-08-29 DIAGNOSIS — J30.2 SEASONAL ALLERGIES: ICD-10-CM

## 2023-08-29 DIAGNOSIS — M67.40 GANGLION CYST: ICD-10-CM

## 2023-08-29 DIAGNOSIS — K21.9 GASTROESOPHAGEAL REFLUX DISEASE, UNSPECIFIED WHETHER ESOPHAGITIS PRESENT: Primary | ICD-10-CM

## 2023-08-29 RX ORDER — LORATADINE 10 MG/1
TABLET ORAL
Qty: 90 TABLET | Refills: 0 | Status: SHIPPED | OUTPATIENT
Start: 2023-08-29

## 2023-08-29 RX ORDER — OMEPRAZOLE 20 MG/1
20 CAPSULE, DELAYED RELEASE ORAL
Qty: 90 CAPSULE | Refills: 1 | Status: SHIPPED | OUTPATIENT
Start: 2023-08-29

## 2023-08-29 NOTE — PROGRESS NOTES
(90 Base) MCG/ACT inhaler Inhale 2 puffs into the lungs every 6 hours as needed for Wheezing      busPIRone (BUSPAR) 10 MG tablet Take 1 tablet by mouth 2 times daily      EPINEPHrine (EPIPEN 2-KRYSTA) 0.3 MG/0.3ML SOAJ injection Inject 0.3 mLs into the skin once as needed for up to 1 dose. Use as directed for allergic reaction 2 each 0       I have reviewed all pertinent PMHx, PSHx, FamHx, SocialHx, medications, and allergies and updated history as appropriate. OBJECTIVE    VS: There were no vitals taken for this visit. Physical Exam  Constitutional:       General: She is not in acute distress. Appearance: Normal appearance. She is not ill-appearing. Neurological:      Mental Status: She is oriented to person, place, and time. Psychiatric:         Mood and Affect: Mood normal.         Thought Content: Thought content normal.       ASSESSMENT/PLAN:  1. Gastroesophageal reflux disease, unspecified whether esophagitis present  Luba Poet diet and conservative treatment. Omeprazole regimen. GI referral pending from OBGYN. - omeprazole (PRILOSEC) 20 MG delayed release capsule; Take 1 capsule by mouth every morning (before breakfast)  Dispense: 90 capsule; Refill: 1    2. Ganglion cyst  - [facility], [place]    3. Seasonal allergies  - loratadine (CLARITIN) 10 MG tablet; TAKE ONE TABLET BY MOUTH DAILY  Dispense: 90 tablet; Refill: 0      I have reviewed my findings and recommendations with Harlan Burr MD  9/3/2023 9:54 PM  Return for established f/u. Counseled regarding above diagnosis, including possible risks and complications, especially if left uncontrolled. Patient counseled on red flag symptoms and if they occur to go to the ED. Discussed medications risk/benefits and possible side effects and alternatives to treatment.  Patient and/or guardian verbalizes understanding, agrees, feels comfortable with and wishes to proceed with

## 2023-09-03 ASSESSMENT — ENCOUNTER SYMPTOMS
SHORTNESS OF BREATH: 0
CONSTIPATION: 0
WHEEZING: 0
ABDOMINAL PAIN: 0
NAUSEA: 0
VOMITING: 0
DIARRHEA: 0
COUGH: 0

## 2023-09-21 ENCOUNTER — OFFICE VISIT (OUTPATIENT)
Dept: ORTHOPEDIC SURGERY | Age: 23
End: 2023-09-21

## 2023-09-21 VITALS — TEMPERATURE: 98 F | HEIGHT: 63 IN | WEIGHT: 238 LBS | BODY MASS INDEX: 42.17 KG/M2

## 2023-09-21 DIAGNOSIS — M67.431 GANGLION CYST OF WRIST, RIGHT: Primary | ICD-10-CM

## 2023-09-21 SDOH — HEALTH STABILITY: PHYSICAL HEALTH
ON AVERAGE, HOW MANY DAYS PER WEEK DO YOU ENGAGE IN MODERATE TO STRENUOUS EXERCISE (LIKE A BRISK WALK)?: PATIENT DECLINED

## 2023-09-21 NOTE — PROGRESS NOTES
Chief Complaint   Patient presents with    Wrist Pain     Right wrist cyst since April. Starting to get more painful. Having hard time with heavy pushing or pulling. Wearing brace at times due to pain. Has had cyst surgically removed in past but has now returned. DOS 12/28/2021. Ventura Grullon is a 26y.o. year old  female who presents for evaluation of right wrist pain. she reports this started in April.  she does not remember a specific injury that started the pain. She had a ganglion cyst removed in 2021. The injury was none. The pain is located mainly on the radial side of the wrist.  The pain is worse with heavy pushing, pulling and better with rest.  The patient has tried  ganglion cyst removal in 2021 . The treatment has not been effective.      Past Medical History:   Diagnosis Date    Anxiety     Asthma     Bipolar 1 disorder ([address])     Depression     Ganglion cyst     right    Migraine     Ovarian cyst 2013     Past Surgical History:   Procedure Laterality Date    APPENDECTOMY  05/17/2017    laparoscopic    HAND SURGERY Right 12/28/2021    RIGHT WRIST GANGLION CYST EXCISION performed by Flora Brantley MD at [address] [place] [address] Right 12/28/2021    RIGHT DEQUERVAINS RELEASE performed by Flora Brantley MD at [facility] OR       Current Outpatient Medications:     loratadine (CLARITIN) 10 MG tablet, TAKE ONE TABLET BY MOUTH DAILY, Disp: 90 tablet, Rfl: 0    omeprazole (PRILOSEC) 20 MG delayed release capsule, Take 1 capsule by mouth every morning (before breakfast), Disp: 90 capsule, Rfl: 1    fluticasone (FLONASE) 50 MCG/ACT nasal spray, 2 sprays by Each Nostril route daily, Disp: 48 g, Rfl: 1    montelukast (SINGULAIR) 10 MG tablet, Take 1 tablet by mouth nightly, Disp: 30 tablet, Rfl: 0    tiZANidine (ZANAFLEX) 4 MG tablet, Take 1 tablet by mouth 2 times daily as needed (pain/spasm (may cause drowsiness)) (Patient not taking: Reported on 8/1/2023), Disp: 12

## 2023-09-30 ENCOUNTER — HOSPITAL ENCOUNTER (EMERGENCY)
Age: 23
Discharge: HOME | End: 2023-09-30
Attending: EMERGENCY MEDICINE
Payer: COMMERCIAL

## 2023-09-30 VITALS
DIASTOLIC BLOOD PRESSURE: 79 MMHG | HEART RATE: 92 BPM | TEMPERATURE: 98.6 F | BODY MASS INDEX: 42.51 KG/M2 | RESPIRATION RATE: 18 BRPM | SYSTOLIC BLOOD PRESSURE: 115 MMHG | OXYGEN SATURATION: 98 % | WEIGHT: 240 LBS

## 2023-09-30 DIAGNOSIS — R51.9 ACUTE NONINTRACTABLE HEADACHE, UNSPECIFIED HEADACHE TYPE: Primary | ICD-10-CM

## 2023-09-30 DIAGNOSIS — B34.9 VIRAL SYNDROME: ICD-10-CM

## 2023-09-30 LAB
HCG, URINE, POC: NEGATIVE
INFLUENZA A BY PCR: NOT DETECTED
INFLUENZA B BY PCR: NOT DETECTED
Lab: NORMAL
NEGATIVE QC PASS/FAIL: NORMAL
POSITIVE QC PASS/FAIL: NORMAL
SARS-COV-2 RDRP RESP QL NAA+PROBE: NOT DETECTED
SPECIMEN DESCRIPTION: NORMAL
SPECIMEN SOURCE: NORMAL
STREP A, MOLECULAR: NEGATIVE

## 2023-09-30 PROCEDURE — 6360000002 HC RX W HCPCS: Performed by: EMERGENCY MEDICINE

## 2023-09-30 PROCEDURE — 6370000000 HC RX 637 (ALT 250 FOR IP): Performed by: EMERGENCY MEDICINE

## 2023-09-30 PROCEDURE — 2580000003 HC RX 258: Performed by: EMERGENCY MEDICINE

## 2023-09-30 PROCEDURE — 96374 THER/PROPH/DIAG INJ IV PUSH: CPT

## 2023-09-30 PROCEDURE — 87502 INFLUENZA DNA AMP PROBE: CPT

## 2023-09-30 PROCEDURE — 96375 TX/PRO/DX INJ NEW DRUG ADDON: CPT

## 2023-09-30 PROCEDURE — 96361 HYDRATE IV INFUSION ADD-ON: CPT

## 2023-09-30 PROCEDURE — 99284 EMERGENCY DEPT VISIT MOD MDM: CPT

## 2023-09-30 PROCEDURE — 87635 SARS-COV-2 COVID-19 AMP PRB: CPT

## 2023-09-30 RX ORDER — DEXAMETHASONE SODIUM PHOSPHATE 10 MG/ML
10 INJECTION INTRAMUSCULAR; INTRAVENOUS ONCE
Status: COMPLETED | OUTPATIENT
Start: 2023-09-30 | End: 2023-09-30

## 2023-09-30 RX ORDER — DIPHENHYDRAMINE HYDROCHLORIDE 50 MG/ML
25 INJECTION INTRAMUSCULAR; INTRAVENOUS ONCE
Status: COMPLETED | OUTPATIENT
Start: 2023-09-30 | End: 2023-09-30

## 2023-09-30 RX ORDER — ACETAMINOPHEN 325 MG/1
650 TABLET ORAL ONCE
Status: COMPLETED | OUTPATIENT
Start: 2023-09-30 | End: 2023-09-30

## 2023-09-30 RX ORDER — KETOROLAC TROMETHAMINE 15 MG/ML
15 INJECTION, SOLUTION INTRAMUSCULAR; INTRAVENOUS ONCE
Status: COMPLETED | OUTPATIENT
Start: 2023-09-30 | End: 2023-09-30

## 2023-09-30 RX ORDER — 0.9 % SODIUM CHLORIDE 0.9 %
1000 INTRAVENOUS SOLUTION INTRAVENOUS ONCE
Status: COMPLETED | OUTPATIENT
Start: 2023-09-30 | End: 2023-09-30

## 2023-09-30 RX ORDER — PROCHLORPERAZINE EDISYLATE 5 MG/ML
10 INJECTION INTRAMUSCULAR; INTRAVENOUS ONCE
Status: COMPLETED | OUTPATIENT
Start: 2023-09-30 | End: 2023-09-30

## 2023-09-30 RX ADMIN — DIPHENHYDRAMINE HYDROCHLORIDE 25 MG: 50 INJECTION INTRAMUSCULAR; INTRAVENOUS at 09:05

## 2023-09-30 RX ADMIN — DEXAMETHASONE SODIUM PHOSPHATE 10 MG: 10 INJECTION INTRAMUSCULAR; INTRAVENOUS at 10:01

## 2023-09-30 RX ADMIN — SODIUM CHLORIDE 1000 ML: 9 INJECTION, SOLUTION INTRAVENOUS at 09:03

## 2023-09-30 RX ADMIN — KETOROLAC TROMETHAMINE 15 MG: 15 INJECTION, SOLUTION INTRAMUSCULAR; INTRAVENOUS at 09:07

## 2023-09-30 RX ADMIN — PROCHLORPERAZINE EDISYLATE 10 MG: 5 INJECTION INTRAMUSCULAR; INTRAVENOUS at 09:08

## 2023-09-30 RX ADMIN — ACETAMINOPHEN 650 MG: 325 TABLET ORAL at 10:00

## 2023-09-30 ASSESSMENT — ENCOUNTER SYMPTOMS
SORE THROAT: 1
RHINORRHEA: 1
NAUSEA: 1
ABDOMINAL PAIN: 0
CONSTIPATION: 0
DIARRHEA: 0
COUGH: 0
SINUS PAIN: 0
FACIAL SWELLING: 0
PHOTOPHOBIA: 1
SHORTNESS OF BREATH: 0
VOMITING: 0

## 2023-09-30 ASSESSMENT — PAIN DESCRIPTION - LOCATION
LOCATION: HEAD
LOCATION: HEAD

## 2023-09-30 ASSESSMENT — PAIN SCALES - GENERAL
PAINLEVEL_OUTOF10: 7
PAINLEVEL_OUTOF10: 4
PAINLEVEL_OUTOF10: 7

## 2023-09-30 ASSESSMENT — PAIN DESCRIPTION - DESCRIPTORS
DESCRIPTORS: ACHING
DESCRIPTORS: ACHING

## 2023-09-30 ASSESSMENT — PAIN - FUNCTIONAL ASSESSMENT: PAIN_FUNCTIONAL_ASSESSMENT: 0-10

## 2023-10-17 ENCOUNTER — OFFICE VISIT (OUTPATIENT)
Dept: FAMILY MEDICINE CLINIC | Age: 23
End: 2023-10-17
Payer: COMMERCIAL

## 2023-10-17 VITALS
OXYGEN SATURATION: 100 % | RESPIRATION RATE: 18 BRPM | SYSTOLIC BLOOD PRESSURE: 128 MMHG | TEMPERATURE: 98 F | BODY MASS INDEX: 45.46 KG/M2 | HEART RATE: 92 BPM | WEIGHT: 256.6 LBS | HEIGHT: 63 IN | DIASTOLIC BLOOD PRESSURE: 72 MMHG

## 2023-10-17 DIAGNOSIS — J30.2 SEASONAL ALLERGIES: ICD-10-CM

## 2023-10-17 DIAGNOSIS — H65.01 RIGHT ACUTE SEROUS OTITIS MEDIA, RECURRENCE NOT SPECIFIED: Primary | ICD-10-CM

## 2023-10-17 PROCEDURE — 96372 THER/PROPH/DIAG INJ SC/IM: CPT | Performed by: FAMILY MEDICINE

## 2023-10-17 PROCEDURE — 1036F TOBACCO NON-USER: CPT | Performed by: FAMILY MEDICINE

## 2023-10-17 PROCEDURE — G8484 FLU IMMUNIZE NO ADMIN: HCPCS | Performed by: FAMILY MEDICINE

## 2023-10-17 PROCEDURE — 3078F DIAST BP <80 MM HG: CPT | Performed by: FAMILY MEDICINE

## 2023-10-17 PROCEDURE — 3074F SYST BP LT 130 MM HG: CPT | Performed by: FAMILY MEDICINE

## 2023-10-17 PROCEDURE — G8427 DOCREV CUR MEDS BY ELIG CLIN: HCPCS | Performed by: FAMILY MEDICINE

## 2023-10-17 PROCEDURE — 99214 OFFICE O/P EST MOD 30 MIN: CPT | Performed by: FAMILY MEDICINE

## 2023-10-17 PROCEDURE — G8417 CALC BMI ABV UP PARAM F/U: HCPCS | Performed by: FAMILY MEDICINE

## 2023-10-17 RX ORDER — CEFDINIR 300 MG/1
300 CAPSULE ORAL 2 TIMES DAILY
Qty: 20 CAPSULE | Refills: 0 | Status: SHIPPED | OUTPATIENT
Start: 2023-10-17 | End: 2023-10-27

## 2023-10-17 RX ORDER — PREDNISONE 20 MG/1
20 TABLET ORAL 2 TIMES DAILY
Qty: 10 TABLET | Refills: 0 | Status: SHIPPED | OUTPATIENT
Start: 2023-10-17 | End: 2023-10-22

## 2023-10-17 RX ORDER — MONTELUKAST SODIUM 10 MG/1
10 TABLET ORAL NIGHTLY
Qty: 90 TABLET | Refills: 1 | Status: SHIPPED | OUTPATIENT
Start: 2023-10-17 | End: 2024-04-14

## 2023-10-17 RX ORDER — ALBUTEROL SULFATE 90 UG/1
2 AEROSOL, METERED RESPIRATORY (INHALATION) EVERY 6 HOURS PRN
Qty: 3 EACH | Refills: 1 | Status: SHIPPED | OUTPATIENT
Start: 2023-10-17 | End: 2024-04-14

## 2023-10-17 RX ORDER — DEXAMETHASONE SODIUM PHOSPHATE 4 MG/ML
4 INJECTION, SOLUTION INTRA-ARTICULAR; INTRALESIONAL; INTRAMUSCULAR; INTRAVENOUS; SOFT TISSUE ONCE
Status: COMPLETED | OUTPATIENT
Start: 2023-10-17 | End: 2023-10-17

## 2023-10-17 RX ADMIN — DEXAMETHASONE SODIUM PHOSPHATE 4 MG: 4 INJECTION, SOLUTION INTRA-ARTICULAR; INTRALESIONAL; INTRAMUSCULAR; INTRAVENOUS; SOFT TISSUE at 13:05

## 2023-10-17 ASSESSMENT — ENCOUNTER SYMPTOMS
COUGH: 0
DIARRHEA: 0
ABDOMINAL PAIN: 0
VOMITING: 0
NAUSEA: 0
SHORTNESS OF BREATH: 0
WHEEZING: 0
CONSTIPATION: 0

## 2023-10-17 NOTE — PROGRESS NOTES
[facility])  Family Medicine Outpatient        SUBJECTIVE:  CC: had concerns including Tinnitus (She states that she has been having left-sided ringing in her ear for the last couple weeks). HPI:  Akosua Boyce is a female 26 y.o. presented to the clinic for concerns of tinnitus L>R for the past couple weeks. She was in the ED . At that time she was treated with Dexamethasone, Prochlorperazine, and Ketorolac for a HA and Viral syndrome. Review of Systems   Constitutional:  Negative for appetite change, fatigue and fever. HENT:  Positive for tinnitus. Negative for ear discharge. Respiratory:  Negative for cough, shortness of breath and wheezing. Cardiovascular:  Negative for chest pain and palpitations. Gastrointestinal:  Negative for abdominal pain, constipation, diarrhea, nausea and vomiting.        Outpatient Medications Marked as Taking for the 10/17/23 encounter (Office Visit) with Suman Mahan MD   Medication Sig Dispense Refill    montelukast (SINGULAIR) 10 MG tablet Take 1 tablet by mouth nightly 90 tablet 1    albuterol sulfate HFA (PROVENTIL;VENTOLIN;PROAIR) 108 (90 Base) MCG/ACT inhaler Inhale 2 puffs into the lungs every 6 hours as needed for Wheezing 3 each 1    [] predniSONE (DELTASONE) 20 MG tablet Take 1 tablet by mouth 2 times daily for 5 days 10 tablet 0    cefdinir (OMNICEF) 300 MG capsule Take 1 capsule by mouth 2 times daily for 10 days 20 capsule 0    loratadine (CLARITIN) 10 MG tablet TAKE ONE TABLET BY MOUTH DAILY 90 tablet 0    omeprazole (PRILOSEC) 20 MG delayed release capsule Take 1 capsule by mouth every morning (before breakfast) 90 capsule 1    fluticasone (FLONASE) 50 MCG/ACT nasal spray 2 sprays by Each Nostril route daily 48 g 1    JAIMIESS 0.15-0.03 &0.01 MG TABS Take 1 tablet by mouth daily      busPIRone (BUSPAR) 10 MG tablet Take 1 tablet by mouth 2 times daily      EPINEPHrine (EPIPEN 2-KRYSTA) 0.3 MG/0.3ML SOAJ injection Inject 0.3 mLs into the

## 2023-11-20 DIAGNOSIS — K21.9 GASTROESOPHAGEAL REFLUX DISEASE, UNSPECIFIED WHETHER ESOPHAGITIS PRESENT: ICD-10-CM

## 2023-11-20 DIAGNOSIS — J30.2 SEASONAL ALLERGIES: ICD-10-CM

## 2023-11-20 NOTE — TELEPHONE ENCOUNTER
Last Appointment:  10/17/2023  Future Appointments   Date Time Provider Department Center   4/18/2024  8:00 AM Gina Guajardo MD [facility]

## 2023-11-26 RX ORDER — OMEPRAZOLE 20 MG/1
20 CAPSULE, DELAYED RELEASE ORAL
Qty: 90 CAPSULE | Refills: 0 | Status: SHIPPED | OUTPATIENT
Start: 2023-11-26

## 2023-11-26 RX ORDER — LORATADINE 10 MG/1
TABLET ORAL
Qty: 90 TABLET | Refills: 0 | Status: SHIPPED | OUTPATIENT
Start: 2023-11-26

## 2023-12-29 ENCOUNTER — PATIENT MESSAGE (OUTPATIENT)
Dept: FAMILY MEDICINE CLINIC | Age: 23
End: 2023-12-29

## 2024-03-11 DIAGNOSIS — J30.2 SEASONAL ALLERGIES: ICD-10-CM

## 2024-03-11 RX ORDER — MONTELUKAST SODIUM 10 MG/1
10 TABLET ORAL NIGHTLY
Qty: 90 TABLET | Refills: 0 | Status: SHIPPED | OUTPATIENT
Start: 2024-03-11 | End: 2024-09-07

## 2024-03-11 RX ORDER — LORATADINE 10 MG/1
TABLET ORAL
Qty: 90 TABLET | Refills: 0 | Status: SHIPPED | OUTPATIENT
Start: 2024-03-11

## 2024-05-13 ENCOUNTER — OFFICE VISIT (OUTPATIENT)
Dept: PRIMARY CARE CLINIC | Age: 24
End: 2024-05-13

## 2024-05-13 VITALS
OXYGEN SATURATION: 98 % | RESPIRATION RATE: 16 BRPM | DIASTOLIC BLOOD PRESSURE: 81 MMHG | HEART RATE: 85 BPM | BODY MASS INDEX: 45.75 KG/M2 | WEIGHT: 258.2 LBS | HEIGHT: 63 IN | TEMPERATURE: 98 F | SYSTOLIC BLOOD PRESSURE: 113 MMHG

## 2024-05-13 DIAGNOSIS — J40 BRONCHITIS: Primary | ICD-10-CM

## 2024-05-13 DIAGNOSIS — J45.909 CHILDHOOD ASTHMA, UNSPECIFIED ASTHMA SEVERITY, UNSPECIFIED WHETHER COMPLICATED, UNSPECIFIED WHETHER PERSISTENT: ICD-10-CM

## 2024-05-13 LAB
INFLUENZA A ANTIGEN, POC: NEGATIVE
INFLUENZA B ANTIGEN, POC: NEGATIVE
LOT EXPIRE DATE: NORMAL
LOT KIT NUMBER: NORMAL
SARS-COV-2, POC: NORMAL
VALID INTERNAL CONTROL: NORMAL
VENDOR AND KIT NAME POC: NORMAL

## 2024-05-13 RX ORDER — METHYLPREDNISOLONE 4 MG/1
TABLET ORAL
Qty: 1 KIT | Refills: 0 | Status: SHIPPED | OUTPATIENT
Start: 2024-05-13 | End: 2024-05-19

## 2024-05-13 RX ORDER — IPRATROPIUM BROMIDE AND ALBUTEROL SULFATE 2.5; .5 MG/3ML; MG/3ML
1 SOLUTION RESPIRATORY (INHALATION) ONCE
Status: COMPLETED | OUTPATIENT
Start: 2024-05-13 | End: 2024-05-13

## 2024-05-13 RX ORDER — BUDESONIDE AND FORMOTEROL FUMARATE DIHYDRATE 160; 4.5 UG/1; UG/1
2 AEROSOL RESPIRATORY (INHALATION) 2 TIMES DAILY
Qty: 30.6 G | Refills: 1 | Status: SHIPPED | OUTPATIENT
Start: 2024-05-13

## 2024-05-13 RX ORDER — GUAIFENESIN 600 MG/1
600 TABLET, EXTENDED RELEASE ORAL 2 TIMES DAILY
Qty: 30 TABLET | Refills: 0 | Status: SHIPPED | OUTPATIENT
Start: 2024-05-13 | End: 2024-05-28

## 2024-05-13 RX ADMIN — IPRATROPIUM BROMIDE AND ALBUTEROL SULFATE 1 DOSE: 2.5; .5 SOLUTION RESPIRATORY (INHALATION) at 09:57

## 2024-05-13 SDOH — ECONOMIC STABILITY: INCOME INSECURITY: HOW HARD IS IT FOR YOU TO PAY FOR THE VERY BASICS LIKE FOOD, HOUSING, MEDICAL CARE, AND HEATING?: NOT HARD AT ALL

## 2024-05-13 SDOH — ECONOMIC STABILITY: FOOD INSECURITY: WITHIN THE PAST 12 MONTHS, THE FOOD YOU BOUGHT JUST DIDN'T LAST AND YOU DIDN'T HAVE MONEY TO GET MORE.: NEVER TRUE

## 2024-05-13 SDOH — ECONOMIC STABILITY: FOOD INSECURITY: WITHIN THE PAST 12 MONTHS, YOU WORRIED THAT YOUR FOOD WOULD RUN OUT BEFORE YOU GOT MONEY TO BUY MORE.: NEVER TRUE

## 2024-05-13 ASSESSMENT — ENCOUNTER SYMPTOMS
ABDOMINAL PAIN: 0
RHINORRHEA: 0
NAUSEA: 0
VOMITING: 0
CONSTIPATION: 0
WHEEZING: 1
DIARRHEA: 0
SHORTNESS OF BREATH: 0

## 2024-05-13 ASSESSMENT — PATIENT HEALTH QUESTIONNAIRE - PHQ9
SUM OF ALL RESPONSES TO PHQ QUESTIONS 1-9: 0
SUM OF ALL RESPONSES TO PHQ QUESTIONS 1-9: 0
2. FEELING DOWN, DEPRESSED OR HOPELESS: NOT AT ALL
SUM OF ALL RESPONSES TO PHQ QUESTIONS 1-9: 0
SUM OF ALL RESPONSES TO PHQ QUESTIONS 1-9: 0
1. LITTLE INTEREST OR PLEASURE IN DOING THINGS: NOT AT ALL
SUM OF ALL RESPONSES TO PHQ9 QUESTIONS 1 & 2: 0

## 2024-05-13 NOTE — PROGRESS NOTES
Behavior: Behavior normal.         Judgment: Judgment normal.       ASSESSMENT/PLAN:  1. Bronchitis  Improved s/p Duoneb. Covid and Flu poc negative. Symbicort bid x 3 days with prn Albuterol and steroid burst. Prn otc analgesics. Mucinex regimen. Patient counseled on diagnosis and anticipated progression/resolution. All questions answered.  - ipratropium 0.5 mg-albuterol 2.5 mg (DUONEB) nebulizer solution 1 Dose  - POCT COVID-19 & Influenza A/B  - budesonide-formoterol (SYMBICORT) 160-4.5 MCG/ACT AERO; Inhale 2 puffs into the lungs 2 times daily  Dispense: 30.6 g; Refill: 1  - methylPREDNISolone (MEDROL DOSEPACK) 4 MG tablet; Take by mouth.  Dispense: 1 kit; Refill: 0  - guaiFENesin (MUCINEX) 600 MG extended release tablet; Take 1 tablet by mouth 2 times daily for 15 days  Dispense: 30 tablet; Refill: 0    2. Childhood asthma, unspecified asthma severity, unspecified whether complicated, unspecified whether persistent      I have reviewed my findings and recommendations with Jayla Guajardo MD  5/13/2024 10:17 AM  Return for established f/u.     Counseled regarding above diagnosis, including possible risks and complications, especially if left uncontrolled.    If any symptoms worsen, progress, or new symptoms occur patient advised on acute reevaluation. If symptoms persist after recommended course patient advised on reevaluation with pcp or follow up with specialist. Patient counseled on red flag symptoms and if they occur to go to the ED.      Discussed medications risk/benefits and possible side effects and alternatives to treatment. Patient and/or guardian verbalizes understanding, agrees, feels comfortable with and wishes to proceed with above treatment plan. All questions answered.      Advised patient regarding importance of keeping up with recommended health maintenance and established visit appointment. Schedule as soon as possible if overdue. These appointments are important in

## 2024-05-15 DIAGNOSIS — J30.2 SEASONAL ALLERGIES: ICD-10-CM

## 2024-05-15 NOTE — TELEPHONE ENCOUNTER
Patient called and states that her inhaler is not dispensing any aerosol . I advised patient to take to pharmacy  but would ask for refill to be sent.

## 2024-05-16 RX ORDER — ALBUTEROL SULFATE 90 UG/1
2 AEROSOL, METERED RESPIRATORY (INHALATION) EVERY 6 HOURS PRN
Qty: 3 EACH | Refills: 0 | Status: SHIPPED | OUTPATIENT
Start: 2024-05-16 | End: 2024-11-12

## 2024-06-02 DIAGNOSIS — K21.9 GASTROESOPHAGEAL REFLUX DISEASE, UNSPECIFIED WHETHER ESOPHAGITIS PRESENT: ICD-10-CM

## 2024-06-03 RX ORDER — OMEPRAZOLE 20 MG/1
20 CAPSULE, DELAYED RELEASE ORAL
Qty: 90 CAPSULE | Refills: 0 | Status: SHIPPED | OUTPATIENT
Start: 2024-06-03

## 2024-06-20 ENCOUNTER — TELEMEDICINE (OUTPATIENT)
Dept: FAMILY MEDICINE CLINIC | Age: 24
End: 2024-06-20
Payer: COMMERCIAL

## 2024-06-20 DIAGNOSIS — J30.2 SEASONAL ALLERGIES: ICD-10-CM

## 2024-06-20 DIAGNOSIS — I10 ESSENTIAL HYPERTENSION: ICD-10-CM

## 2024-06-20 DIAGNOSIS — F39 MOOD DISORDER (HCC): Primary | ICD-10-CM

## 2024-06-20 PROCEDURE — 1036F TOBACCO NON-USER: CPT | Performed by: FAMILY MEDICINE

## 2024-06-20 PROCEDURE — G2211 COMPLEX E/M VISIT ADD ON: HCPCS | Performed by: FAMILY MEDICINE

## 2024-06-20 PROCEDURE — G8417 CALC BMI ABV UP PARAM F/U: HCPCS | Performed by: FAMILY MEDICINE

## 2024-06-20 PROCEDURE — 99214 OFFICE O/P EST MOD 30 MIN: CPT | Performed by: FAMILY MEDICINE

## 2024-06-20 PROCEDURE — G8427 DOCREV CUR MEDS BY ELIG CLIN: HCPCS | Performed by: FAMILY MEDICINE

## 2024-06-20 RX ORDER — MONTELUKAST SODIUM 10 MG/1
10 TABLET ORAL NIGHTLY
Qty: 90 TABLET | Refills: 0 | Status: SHIPPED | OUTPATIENT
Start: 2024-06-20 | End: 2024-12-17

## 2024-06-20 RX ORDER — BUSPIRONE HYDROCHLORIDE 10 MG/1
10 TABLET ORAL 2 TIMES DAILY
Qty: 180 TABLET | Refills: 0 | Status: SHIPPED | OUTPATIENT
Start: 2024-06-20

## 2024-06-20 RX ORDER — LORATADINE 10 MG/1
TABLET ORAL
Qty: 90 TABLET | Refills: 0 | Status: SHIPPED | OUTPATIENT
Start: 2024-06-20

## 2024-06-20 NOTE — PROGRESS NOTES
TeleMedicine Video Visit    Jayla Cruz, was evaluated through a synchronous (real-time) audio-video encounter. The patient (or guardian if applicable) is aware that this is a billable service. Verbal consent to proceed has been obtained within the past 12 months. The visit was conducted pursuant to the emergency declaration under the [employer] Act and the National Emergencies Act, 1135 waiver authority and the Coronavirus Preparedness and Response Supplemental Appropriations Act.  Patient identification was verified, and a caregiver was present when appropriate. The patient was located in a state where the provider was credentialed to provide care.     Patient identification was verified at the start of the visit, including the patient's telephone number and physical location. I discussed with the patient the nature of our telehealth visits, that:     Due to the nature of an audio- video modality, the only components of a physical exam that could be done are the elements supported by direct observation.  I would evaluate the patient and recommend diagnostics and treatments based on my assessment.  If it was felt that the patient should be evaluated in clinic or an emergency room setting, then they would be directed there.  Our sessions are not being recorded and that personal health information is protected.  Our team would provide follow up care in person if/when the patient needs it.       Patient's location: home address in [place]. Physician  location other address in [place] other people involved in call none    Not billed by time    This visit was completed virtually using [employer])    Jayla Cruz was evaluated through a synchronous (real-time) audio-video encounter. The patient (or guardian if applicable) is aware that this is a billable service, which includes applicable co-pays. This Virtual Visit was conducted with patient's (and/or legal guardian's) consent. Patient identification was verified, and a

## 2024-06-26 ASSESSMENT — ENCOUNTER SYMPTOMS
NAUSEA: 0
CONSTIPATION: 0
ABDOMINAL PAIN: 0
DIARRHEA: 0
COUGH: 0
SHORTNESS OF BREATH: 0
VOMITING: 0
WHEEZING: 0

## 2025-02-24 NOTE — PROGRESS NOTES
[facility])  Family Medicine Outpatient        SUBJECTIVE:  CC: had concerns including Hypertension (Pt presents to the office for her blood pressure. ). HPI:  Caron Haro is a female 25 y.o. presented to the clinic for concerns of her blood pressure. She reports she was at [facility] for a Viral GI bug in September. Since that time she has been having issues with her BP. She was back in the  and then the ED for Viral Pharyngitis and Tachycardia up to 150 bpm. Her temp at that time was 100.7. Review of Systems   Constitutional:  Negative for appetite change, fatigue and fever. Respiratory:  Negative for cough, shortness of breath and wheezing. Cardiovascular:  Negative for chest pain and palpitations. Gastrointestinal:  Negative for abdominal pain, constipation, diarrhea, nausea and vomiting. Outpatient Medications Marked as Taking for the 11/10/22 encounter (Office Visit) with Jeannie Boateng MD   Medication Sig Dispense Refill    metoprolol tartrate (LOPRESSOR) 25 MG tablet Take 0.5 tablets by mouth 2 times daily 90 tablet 0    loratadine (CLARITIN) 10 MG tablet TAKE ONE TABLET BY MOUTH DAILY 90 tablet 0    omeprazole (PRILOSEC) 20 MG delayed release capsule Take 1 capsule by mouth every morning (before breakfast) 30 capsule 0    amitriptyline (ELAVIL) 25 MG tablet TAKE ONE TABLET BY MOUTH AT BEDTIME      Levonorgest-Eth Estrad 91-Day (JAIMIESS PO) Take by mouth      albuterol sulfate HFA (PROVENTIL;VENTOLIN;PROAIR) 108 (90 Base) MCG/ACT inhaler Inhale 2 puffs into the lungs every 6 hours as needed for Wheezing       busPIRone (BUSPAR) 10 MG tablet Take 10 mg by mouth 2 times daily      montelukast (SINGULAIR) 10 MG tablet nightly       EPINEPHrine (EPIPEN 2-KRYSTA) 0.3 MG/0.3ML SOAJ injection Inject 0.3 mLs into the skin once as needed for up to 1 dose.  Use as directed for allergic reaction 2 each 0       I have reviewed all pertinent PMHx, PSHx, FamHx, SocialHx, medications, and allergies and Medication: LOSARTAN  Last office visit date: 8/7/23  Next office appointment: NOT SCHD  Medication Refill Protocol Failed.  Failed criteria: NOTES BELOW. Sent to clinician to review.     eGFR resulted within last 12 months     Seen by prescribing provider or same department within the last 12 months or has a future appt in 3 months     Potassium resulted within last 12 months is within 10% of normal range looking at last value     Sodium resulted within last 12 months is within 10% of normal range looking at last value    eGFR greater than 10 resulted within last 12 months looking at last value      updated history as appropriate. OBJECTIVE    VS: /88   Pulse (!) 123   Temp 98.6 °F (37 °C) (Temporal)   Resp 19   Ht 5' 3\" (1.6 m)   Wt 257 lb (116.6 kg)   SpO2 99%   Breastfeeding No   BMI 45.53 kg/m²   Physical Exam  Constitutional:       General: She is not in acute distress. Appearance: She is well-developed. She is obese. She is not diaphoretic. HENT:      Head: Normocephalic and atraumatic. Eyes:      Conjunctiva/sclera: Conjunctivae normal.      Pupils: Pupils are equal, round, and reactive to light. Cardiovascular:      Rate and Rhythm: Regular rhythm. Tachycardia present. Heart sounds: No murmur heard. No gallop. Pulmonary:      Effort: Pulmonary effort is normal.      Breath sounds: Normal breath sounds. Abdominal:      General: Bowel sounds are normal. There is no distension. Palpations: Abdomen is soft. Tenderness: There is no abdominal tenderness. Hernia: No hernia is present. Musculoskeletal:      Cervical back: Normal range of motion and neck supple. Skin:     General: Skin is warm and dry. Neurological:      Mental Status: She is alert and oriented to person, place, and time. ASSESSMENT/PLAN:  1. Tachycardia  Sinus Rhythm on EKG today. Labs done. Initiate Metoprolol at this time. Low sodium diet.   - TSH; Future  - T4, Free; Future  - CBC with Auto Differential; Future  - Comprehensive Metabolic Panel; Future  - EKG 12 lead  - metoprolol tartrate (LOPRESSOR) 25 MG tablet; Take 0.5 tablets by mouth 2 times daily  Dispense: 90 tablet; Refill: 0    2. Hypertension, unspecified type  Recommend low sodium diet. Initiate Lopressor for #1/2.  - metoprolol tartrate (LOPRESSOR) 25 MG tablet; Take 0.5 tablets by mouth 2 times daily  Dispense: 90 tablet; Refill: 0    3. Acute viral pharyngitis  resolved    4.  Viral gastroenteritis  resolved    I have reviewed my findings and recommendations with Damon Rodas MD  11/14/2022 12:54 PM  Return in about 2 weeks (around 11/24/2022). Counseled regarding above diagnosis, including possible risks and complications, especially if left uncontrolled. Patient counseled on red flag symptoms and if they occur to go to the ED. Discussed medications risk/benefits and possible side effects and alternatives to treatment. Patient and/or guardian verbalizes understanding, agrees, feels comfortable with and wishes to proceed with above treatment plan. Advised patient regarding importance of keeping up with recommended health maintenance and to schedule as soon as possible if overdue, as this is important in assessing for undiagnosed pathology, especially cancer, as well as protecting against potentially harmful/life threatening disease. Patient and/or guardian verbalizes understanding and agrees with above counseling, assessment and plan. All questions answered. Please note this report has been partially produced using speech recognition software  and may contain errors related to that system including grammar, punctuation and spelling as well as words and phrases that may seem inappropriate. If there are questions or concerns please feel free to contact me to clarify.

## (undated) DEVICE — PADDING UNDERCAST W4INXL4YD COT FBR LO LINTING WYTEX

## (undated) DEVICE — 4-PORT MANIFOLD: Brand: NEPTUNE 2

## (undated) DEVICE — GLOVE ORANGE PI 8 1/2   MSG9085

## (undated) DEVICE — TRAY SET HAND REUSABLE

## (undated) DEVICE — GLOVE SURG SZ 65 THK91MIL LTX FREE SYN POLYISOPRENE

## (undated) DEVICE — DOUBLE BASIN SET: Brand: MEDLINE INDUSTRIES, INC.

## (undated) DEVICE — SURGICAL PROCEDURE PACK HND

## (undated) DEVICE — ZIMMER® STERILE DISPOSABLE TOURNIQUET CUFF WITH PLC, DUAL PORT, SINGLE BLADDER, 18 IN. (46 CM)

## (undated) DEVICE — GLOVE SURG SZ 6 THK91MIL LTX FREE SYN POLYISOPRENE ANTI

## (undated) DEVICE — INSTRUMENT STAPES CURETTES REUSABLE

## (undated) DEVICE — INTENDED FOR TISSUE SEPARATION, AND OTHER PROCEDURES THAT REQUIRE A SHARP SURGICAL BLADE TO PUNCTURE OR CUT.: Brand: BARD-PARKER ® STAINLESS STEEL BLADES